# Patient Record
Sex: FEMALE | Race: BLACK OR AFRICAN AMERICAN | NOT HISPANIC OR LATINO | URBAN - METROPOLITAN AREA
[De-identification: names, ages, dates, MRNs, and addresses within clinical notes are randomized per-mention and may not be internally consistent; named-entity substitution may affect disease eponyms.]

---

## 2023-08-13 ENCOUNTER — HOSPITAL ENCOUNTER (OUTPATIENT)
Dept: DATA CONVERSION | Facility: HOSPITAL | Age: 63
Discharge: ANOTHER HOSPITAL | End: 2023-08-14

## 2023-08-13 DIAGNOSIS — R11.0 NAUSEA: ICD-10-CM

## 2023-08-13 DIAGNOSIS — R07.89 OTHER CHEST PAIN: ICD-10-CM

## 2023-08-13 DIAGNOSIS — F17.210 NICOTINE DEPENDENCE, CIGARETTES, UNCOMPLICATED: ICD-10-CM

## 2023-08-13 DIAGNOSIS — R06.02 SHORTNESS OF BREATH: ICD-10-CM

## 2023-08-13 DIAGNOSIS — I21.4 NON-ST ELEVATION (NSTEMI) MYOCARDIAL INFARCTION (MULTI): ICD-10-CM

## 2023-08-13 LAB
ALBUMIN SERPL-MCNC: 4 GM/DL (ref 3.5–5)
ALBUMIN/GLOB SERPL: 1.2 RATIO (ref 1.5–3)
ALP BLD-CCNC: 71 U/L (ref 35–125)
ALT SERPL-CCNC: 12 U/L (ref 5–40)
ANION GAP SERPL CALCULATED.3IONS-SCNC: 10 MMOL/L (ref 0–19)
ANTICOAGULANT: NORMAL
APTT PPP: 23.4 SEC (ref 22–32.5)
AST SERPL-CCNC: 26 U/L (ref 5–40)
BASOPHILS # BLD AUTO: 0.03 K/UL (ref 0–0.22)
BASOPHILS NFR BLD AUTO: 0.5 % (ref 0–1)
BILIRUB SERPL-MCNC: 0.3 MG/DL (ref 0.1–1.2)
BUN SERPL-MCNC: 14 MG/DL (ref 8–25)
BUN/CREAT SERPL: 15.6 RATIO (ref 8–21)
CALCIUM SERPL-MCNC: 9.7 MG/DL (ref 8.5–10.4)
CHLORIDE SERPL-SCNC: 105 MMOL/L (ref 97–107)
CO2 SERPL-SCNC: 23 MMOL/L (ref 24–31)
CREAT SERPL-MCNC: 0.9 MG/DL (ref 0.4–1.6)
DEPRECATED RDW RBC AUTO: 48.5 FL (ref 37–54)
DIFFERENTIAL METHOD BLD: NORMAL
EOSINOPHIL # BLD AUTO: 0.11 K/UL (ref 0–0.45)
EOSINOPHIL NFR BLD: 1.7 % (ref 0–3)
ERYTHROCYTE [DISTWIDTH] IN BLOOD BY AUTOMATED COUNT: 13.8 % (ref 11.7–15)
GFR SERPL CREATININE-BSD FRML MDRD: 72 ML/MIN/1.73 M2
GLOBULIN SER-MCNC: 3.3 G/DL (ref 1.9–3.7)
GLUCOSE SERPL-MCNC: 93 MG/DL (ref 65–99)
HCT VFR BLD AUTO: 42.5 % (ref 36–44)
HGB BLD-MCNC: 13.4 GM/DL (ref 12–15)
HS TROPONIN T DELTA: 101 (ref 0–4)
HS TROPONIN T DELTA: 32 (ref 0–4)
HS TROPONIN T DELTA: ABNORMAL (ref 0–4)
IMM GRANULOCYTES # BLD AUTO: 0.01 K/UL (ref 0–0.1)
INR PPP: 1 (ref 0.86–1.16)
LYMPHOCYTES # BLD AUTO: 1.56 K/UL (ref 1.2–3.2)
LYMPHOCYTES NFR BLD MANUAL: 24.7 % (ref 20–40)
MCH RBC QN AUTO: 29.8 PG (ref 26–34)
MCHC RBC AUTO-ENTMCNC: 31.5 % (ref 31–37)
MCV RBC AUTO: 94.4 FL (ref 80–100)
MONOCYTES # BLD AUTO: 0.33 K/UL (ref 0–0.8)
MONOCYTES NFR BLD MANUAL: 5.2 % (ref 0–8)
NEUTROPHILS # BLD AUTO: 4.28 K/UL
NEUTROPHILS # BLD AUTO: 4.28 K/UL (ref 1.8–7.7)
NEUTROPHILS.IMMATURE NFR BLD: 0.2 % (ref 0–1)
NEUTS SEG NFR BLD: 67.7 % (ref 50–70)
PLATELET # BLD AUTO: 171 K/UL (ref 150–450)
PMV BLD AUTO: 11.2 CU (ref 7–12.6)
POTASSIUM SERPL-SCNC: 4.8 MMOL/L (ref 3.4–5.1)
PROT SERPL-MCNC: 7.3 G/DL (ref 5.9–7.9)
PROTHROMBIN TIME: 9.7 SEC (ref 9.3–12.7)
RBC # BLD AUTO: 4.5 M/UL (ref 4–4.9)
SODIUM SERPL-SCNC: 138 MMOL/L (ref 133–145)
TROPONIN T SERPL-MCNC: 109 NG/L
TROPONIN T SERPL-MCNC: 141 NG/L
TROPONIN T SERPL-MCNC: 242 NG/L
WBC # BLD AUTO: 6.3 K/UL (ref 4.5–11)

## 2023-08-18 ENCOUNTER — PATIENT OUTREACH (OUTPATIENT)
Dept: PRIMARY CARE | Facility: CLINIC | Age: 63
End: 2023-08-18
Payer: MEDICAID

## 2023-08-18 DIAGNOSIS — I21.4 NSTEMI (NON-ST ELEVATED MYOCARDIAL INFARCTION) (MULTI): ICD-10-CM

## 2023-08-18 NOTE — PROGRESS NOTES
TCM call completed (x2 attempts)  Patient is scheduled within 7-14 days of discharge on (8/31/23 ) for hospital follow up, however was unable to reach patient during two business days after discharge despite at least two attempts made.    If patient meets criteria for moderately complex & has follow-up within 14 days-can bill [id number] for hospital follow up.   If patient meets criteria for highly complex & has follow-up visit within 7 days-can bill [id number] for hospital follow up    * Virtual follow up needs modifier added (95 or GT)  AWV AND TCM CAN BE BILLED TOGETHER WITH 25 MODIFIER

## 2023-08-31 ENCOUNTER — OFFICE VISIT (OUTPATIENT)
Dept: PRIMARY CARE | Facility: CLINIC | Age: 63
End: 2023-08-31
Payer: MEDICAID

## 2023-08-31 VITALS
HEIGHT: 63 IN | DIASTOLIC BLOOD PRESSURE: 54 MMHG | RESPIRATION RATE: 15 BRPM | BODY MASS INDEX: 44.63 KG/M2 | OXYGEN SATURATION: 96 % | WEIGHT: 251.9 LBS | SYSTOLIC BLOOD PRESSURE: 102 MMHG | HEART RATE: 76 BPM | TEMPERATURE: 97.4 F

## 2023-08-31 DIAGNOSIS — M17.10 ARTHRITIS OF KNEE: ICD-10-CM

## 2023-08-31 DIAGNOSIS — Z09 HOSPITAL DISCHARGE FOLLOW-UP: Primary | ICD-10-CM

## 2023-08-31 DIAGNOSIS — I21.4 NSTEMI (NON-ST ELEVATED MYOCARDIAL INFARCTION) (MULTI): ICD-10-CM

## 2023-08-31 PROBLEM — R92.8 ABNORMAL MAMMOGRAM: Status: ACTIVE | Noted: 2023-08-31

## 2023-08-31 PROBLEM — G47.33 OBSTRUCTIVE SLEEP APNEA, ADULT: Status: ACTIVE | Noted: 2023-08-31

## 2023-08-31 PROBLEM — M54.50 LOW BACK PAIN: Status: ACTIVE | Noted: 2023-08-31

## 2023-08-31 PROBLEM — M23.92 INTERNAL DERANGEMENT OF LEFT KNEE: Status: ACTIVE | Noted: 2023-08-31

## 2023-08-31 PROBLEM — I83.93 ASYMPTOMATIC VARICOSE VEINS OF BOTH LOWER EXTREMITIES: Status: ACTIVE | Noted: 2023-08-31

## 2023-08-31 PROBLEM — E66.01 MORBID OBESITY (MULTI): Status: ACTIVE | Noted: 2023-08-31

## 2023-08-31 PROBLEM — N39.46 MIXED INCONTINENCE URGE AND STRESS: Status: ACTIVE | Noted: 2023-08-31

## 2023-08-31 PROBLEM — R07.89 ATYPICAL CHEST PAIN: Status: ACTIVE | Noted: 2023-08-31

## 2023-08-31 PROBLEM — M25.531 RIGHT WRIST PAIN: Status: ACTIVE | Noted: 2023-08-31

## 2023-08-31 PROBLEM — M17.12 PRIMARY OSTEOARTHRITIS OF LEFT KNEE: Status: ACTIVE | Noted: 2023-08-31

## 2023-08-31 PROBLEM — R10.30 ABDOMINAL PAIN, LOWER: Status: ACTIVE | Noted: 2023-08-31

## 2023-08-31 PROBLEM — L03.115 CELLULITIS OF RIGHT LOWER EXTREMITY: Status: ACTIVE | Noted: 2023-08-31

## 2023-08-31 PROBLEM — R60.9 EDEMA: Status: ACTIVE | Noted: 2023-08-31

## 2023-08-31 PROBLEM — I83.90 VARICOSE VEIN OF LEG: Status: ACTIVE | Noted: 2023-08-31

## 2023-08-31 PROBLEM — N81.11 CYSTOCELE, MIDLINE: Status: ACTIVE | Noted: 2023-08-31

## 2023-08-31 PROBLEM — E78.5 HYPERLIPIDEMIA: Status: ACTIVE | Noted: 2023-08-31

## 2023-08-31 PROBLEM — R63.4 WEIGHT LOSS, UNINTENTIONAL: Status: ACTIVE | Noted: 2023-08-31

## 2023-08-31 PROBLEM — M25.562 LEFT KNEE PAIN: Status: ACTIVE | Noted: 2023-08-31

## 2023-08-31 PROBLEM — R06.02 SHORTNESS OF BREATH ON EXERTION: Status: ACTIVE | Noted: 2023-08-31

## 2023-08-31 PROBLEM — F17.210 CIGARETTE NICOTINE DEPENDENCE WITHOUT COMPLICATION: Status: ACTIVE | Noted: 2023-08-31

## 2023-08-31 PROBLEM — R31.9 HEMATURIA: Status: ACTIVE | Noted: 2023-08-31

## 2023-08-31 PROBLEM — H90.3 BILATERAL SENSORINEURAL HEARING LOSS: Status: ACTIVE | Noted: 2023-08-31

## 2023-08-31 PROBLEM — R35.0 URINARY FREQUENCY: Status: ACTIVE | Noted: 2023-08-31

## 2023-08-31 PROBLEM — M79.606 LEG PAIN: Status: ACTIVE | Noted: 2023-08-31

## 2023-08-31 PROBLEM — E55.9 VITAMIN D DEFICIENCY: Status: ACTIVE | Noted: 2023-08-31

## 2023-08-31 PROBLEM — E28.39 HYPOESTROGENISM: Status: ACTIVE | Noted: 2023-08-31

## 2023-08-31 PROBLEM — H91.90 HARD OF HEARING: Status: ACTIVE | Noted: 2023-08-31

## 2023-08-31 PROBLEM — N39.41 URGE INCONTINENCE OF URINE: Status: ACTIVE | Noted: 2023-08-31

## 2023-08-31 PROCEDURE — 99213 OFFICE O/P EST LOW 20 MIN: CPT | Performed by: STUDENT IN AN ORGANIZED HEALTH CARE EDUCATION/TRAINING PROGRAM

## 2023-08-31 PROCEDURE — 3008F BODY MASS INDEX DOCD: CPT | Performed by: STUDENT IN AN ORGANIZED HEALTH CARE EDUCATION/TRAINING PROGRAM

## 2023-08-31 RX ORDER — ACETAMINOPHEN 325 MG/1
TABLET ORAL EVERY 6 HOURS
COMMUNITY
Start: 2023-08-16

## 2023-08-31 RX ORDER — METOPROLOL TARTRATE 25 MG/1
1 TABLET, FILM COATED ORAL 2 TIMES DAILY
COMMUNITY
Start: 2023-08-16 | End: 2023-11-10 | Stop reason: SDUPTHER

## 2023-08-31 RX ORDER — CHOLECALCIFEROL (VITAMIN D3) 25 MCG
2 TABLET ORAL DAILY
COMMUNITY
Start: 2021-01-04 | End: 2023-11-10 | Stop reason: WASHOUT

## 2023-08-31 RX ORDER — IBUPROFEN 400 MG/1
TABLET ORAL 3 TIMES DAILY PRN
COMMUNITY
Start: 2022-01-14 | End: 2023-11-10 | Stop reason: ALTCHOICE

## 2023-08-31 RX ORDER — DICLOFENAC SODIUM 10 MG/G
4 GEL TOPICAL 4 TIMES DAILY PRN
Qty: 100 G | Refills: 1 | Status: SHIPPED | OUTPATIENT
Start: 2023-08-31 | End: 2023-11-10 | Stop reason: WASHOUT

## 2023-08-31 RX ORDER — ATORVASTATIN CALCIUM 80 MG/1
1 TABLET, FILM COATED ORAL NIGHTLY
COMMUNITY
Start: 2023-08-16 | End: 2023-11-10 | Stop reason: SDUPTHER

## 2023-08-31 RX ORDER — HYALURONATE SODIUM 20 MG/2 ML
SYRINGE (ML) INTRAARTICULAR
COMMUNITY
Start: 2021-02-03

## 2023-08-31 RX ORDER — CLOPIDOGREL BISULFATE 75 MG/1
1 TABLET ORAL DAILY
COMMUNITY
Start: 2023-08-16 | End: 2023-11-10 | Stop reason: SDUPTHER

## 2023-08-31 RX ORDER — NAPROXEN SODIUM 220 MG/1
1 TABLET, FILM COATED ORAL DAILY
COMMUNITY
Start: 2023-08-21 | End: 2023-11-10 | Stop reason: SDUPTHER

## 2023-08-31 ASSESSMENT — PATIENT HEALTH QUESTIONNAIRE - PHQ9
1. LITTLE INTEREST OR PLEASURE IN DOING THINGS: NOT AT ALL
SUM OF ALL RESPONSES TO PHQ9 QUESTIONS 1 & 2: 0
2. FEELING DOWN, DEPRESSED OR HOPELESS: NOT AT ALL

## 2023-08-31 ASSESSMENT — LIFESTYLE VARIABLES
HOW MANY STANDARD DRINKS CONTAINING ALCOHOL DO YOU HAVE ON A TYPICAL DAY: PATIENT DOES NOT DRINK
SKIP TO QUESTIONS 9-10: 1
HOW OFTEN DO YOU HAVE A DRINK CONTAINING ALCOHOL: NEVER
HOW OFTEN DO YOU HAVE SIX OR MORE DRINKS ON ONE OCCASION: NEVER
AUDIT-C TOTAL SCORE: 0

## 2023-08-31 NOTE — PATIENT INSTRUCTIONS
Continue with current medications.  If blood work or imaging were ordered during your visit, all the nonurgent lab results will be discussed with you at your next office visit.  Please arrive 15 minutes before your appointment.   Return to office in 3 months or as needed with Dr Alfaro

## 2023-08-31 NOTE — PROGRESS NOTES
Subjective   Patient ID: Catarina Finn is a pleasant 62 y.o. female who presents for Hospital Follow-up (Heart attack).  HPI  Patient was admitted to the hospital on 8/14 and was found to have NSTEMI.  She was discharged from the hospital on 8/16.  Followed up with cardiology Dr. Sharpe on 8/21.  Advised to continue with the current medications.  She has arthritis pain in the left knee. Taking Tyelnol. Wouldn't want to take NSAIDs while she is on ASA and plavix.  She cont to smoke, she is cutting down now.     Hospital Course:   Ms. Finn is a 63 y/o female with PMHx of tobacco use who is presenting as a   transfer from [facility] ED for management of NSTEMI. Troponins at   OSH were 109 up trended to 3868 on arrival to [facility]. EKG showed T wave amplitude   elevation in lateral leads, but was otherwise normal. Echo normal with EF of   65%. She had a cath (8/14) that showed no major occlusive disease. Patient   likely had MINOCA as patient had elevated troponins and chest pain. Had   recurrence of chest pressure s/p cath and was started on nitro drip, which was   d/c due to asymptomatic hypotension with SBPs down to 70s. While admitted pt   was started on atorvastatin 80mg, metoprolol tartrate 25mg BID, plavix 75mg,   and ASA 81mg, which she will continue taking as outpatient     Review of Systems   All other systems reviewed and are negative.      Visit Vitals  /54   Pulse 76   Temp 36.3 °C (97.4 °F)   Resp 15          Objective   Physical Exam  Constitutional:       General: She is not in acute distress.     Appearance: Normal appearance.   HENT:      Head: Normocephalic and atraumatic.   Eyes:      General: No scleral icterus.     Conjunctiva/sclera: Conjunctivae normal.   Cardiovascular:      Rate and Rhythm: Normal rate and regular rhythm.      Heart sounds: Normal heart sounds.   Pulmonary:      Effort: Pulmonary effort is normal.      Breath sounds: Normal breath sounds. No wheezing.    Abdominal:      General: Bowel sounds are normal. There is no distension.      Palpations: Abdomen is soft.      Tenderness: There is no abdominal tenderness.   Musculoskeletal:      Cervical back: Neck supple.      Right lower leg: No edema.      Left lower leg: No edema.   Lymphadenopathy:      Cervical: No cervical adenopathy.   Skin:     General: Skin is warm and dry.   Neurological:      General: No focal deficit present.      Mental Status: She is alert and oriented to person, place, and time.   Psychiatric:         Mood and Affect: Mood normal.         Behavior: Behavior normal.         Assessment/Plan   Problem List Items Addressed This Visit       Arthritis of knee    Relevant Medications    diclofenac sodium (Voltaren) 1 % gel gel     Other Visit Diagnoses       Hospital discharge follow-up    -  Primary    NSTEMI (non-ST elevated myocardial infarction) (CMS/[facility])        Relevant Medications    metoprolol tartrate (Lopressor) 25 mg tablet    clopidogrel (Plavix) 75 mg tablet

## 2023-09-12 ENCOUNTER — PATIENT OUTREACH (OUTPATIENT)
Dept: PRIMARY CARE | Facility: CLINIC | Age: 63
End: 2023-09-12
Payer: MEDICAID

## 2023-09-12 NOTE — PROGRESS NOTES
Unable to reach patient for call back after patient's follow up appointment with PCP.   [facility] with call back number for patient to call if needed   If no voicemail available call attempts x 2 were made to contact the patient to assist with any questions or concerns patient may have.

## 2023-09-22 ENCOUNTER — PATIENT OUTREACH (OUTPATIENT)
Dept: PRIMARY CARE | Facility: CLINIC | Age: 63
End: 2023-09-22
Payer: MEDICAID

## 2023-09-22 DIAGNOSIS — Z09 HOSPITAL DISCHARGE FOLLOW-UP: ICD-10-CM

## 2023-09-22 NOTE — PROGRESS NOTES
Unable to reach patient for one month post discharge follow up call.   [facility] with call back number for patient to call if needed   If no voicemail available call attempts x 2 were made to contact the patient to assist with any questions or concerns patient may have. Patient dis-enrolled from [facility] this date due to inability to reach the patient for 30 days after her discharge.

## 2023-10-03 RX ORDER — ONDANSETRON 4 MG/1
1 TABLET, FILM COATED ORAL EVERY 8 HOURS PRN
COMMUNITY
Start: 2021-01-20 | End: 2023-11-10 | Stop reason: ALTCHOICE

## 2023-10-06 ENCOUNTER — OFFICE VISIT (OUTPATIENT)
Dept: ORTHOPEDIC SURGERY | Facility: HOSPITAL | Age: 63
End: 2023-10-06
Payer: MEDICAID

## 2023-10-06 ENCOUNTER — HOSPITAL ENCOUNTER (OUTPATIENT)
Dept: RADIOLOGY | Facility: HOSPITAL | Age: 63
Discharge: HOME | End: 2023-10-06
Payer: MEDICAID

## 2023-10-06 DIAGNOSIS — M25.562 LEFT KNEE PAIN, UNSPECIFIED CHRONICITY: ICD-10-CM

## 2023-10-06 DIAGNOSIS — M17.12 PRIMARY OSTEOARTHRITIS OF LEFT KNEE: Primary | ICD-10-CM

## 2023-10-06 PROCEDURE — 20610 DRAIN/INJ JOINT/BURSA W/O US: CPT | Mod: LT | Performed by: FAMILY MEDICINE

## 2023-10-06 PROCEDURE — 2500000004 HC RX 250 GENERAL PHARMACY W/ HCPCS (ALT 636 FOR OP/ED): Performed by: FAMILY MEDICINE

## 2023-10-06 PROCEDURE — 99214 OFFICE O/P EST MOD 30 MIN: CPT | Performed by: FAMILY MEDICINE

## 2023-10-06 PROCEDURE — 73564 X-RAY EXAM KNEE 4 OR MORE: CPT | Mod: LT,FY

## 2023-10-06 PROCEDURE — 3008F BODY MASS INDEX DOCD: CPT | Performed by: FAMILY MEDICINE

## 2023-10-06 PROCEDURE — 99214 OFFICE O/P EST MOD 30 MIN: CPT | Mod: 25 | Performed by: FAMILY MEDICINE

## 2023-10-06 PROCEDURE — 73564 X-RAY EXAM KNEE 4 OR MORE: CPT | Mod: LEFT SIDE | Performed by: RADIOLOGY

## 2023-10-06 PROCEDURE — 2500000005 HC RX 250 GENERAL PHARMACY W/O HCPCS: Performed by: FAMILY MEDICINE

## 2023-10-06 RX ORDER — LIDOCAINE HYDROCHLORIDE 10 MG/ML
4 INJECTION INFILTRATION; PERINEURAL
Status: COMPLETED | OUTPATIENT
Start: 2023-10-06 | End: 2023-10-06

## 2023-10-06 RX ORDER — TRIAMCINOLONE ACETONIDE 40 MG/ML
40 INJECTION, SUSPENSION INTRA-ARTICULAR; INTRAMUSCULAR
Status: COMPLETED | OUTPATIENT
Start: 2023-10-06 | End: 2023-10-06

## 2023-10-06 RX ADMIN — TRIAMCINOLONE ACETONIDE 40 MG: 40 INJECTION, SUSPENSION INTRA-ARTICULAR; INTRAMUSCULAR at 10:59

## 2023-10-06 RX ADMIN — LIDOCAINE HYDROCHLORIDE 4 ML: 10 INJECTION, SOLUTION INFILTRATION; PERINEURAL at 10:59

## 2023-10-06 NOTE — PROGRESS NOTES
** Please excuse any errors in grammar or translation related to this dictation. Voice recognition software was utilized to prepare this document. **    Assessment & Plan:  62-year-old female presents for ongoing management of her left knee arthritis.  Previously responded well to Euflexxa injections completed in 2021.  She was recently authorized for repeat gel injections however there has been an issue getting these sent for pharmacy to clinic.  She would like to have these completed as soon as possible and has provided the authorization number for these injections to coordinate with the pharmacy.  Informed patient that our clinic will work with her to have these medications obtained as soon as possible.  In the meantime she was offered completion of a steroid injection today to mitigate her symptoms.  She agreed to have this completed as below.  Patient will follow-up once the gel injections are obtained to have completed.  Also discussed need for weight loss to improve overall health but also her joint health specifically.  All questions answered and patient agrees plan of care.    Chief complaint:  Left knee pain    HPI:  62-year-old female, history of a recent MI, presents for chronic left knee pain.  Pain in left knee has been ongoing for several years.  She was previously managed by Dr. Bejarano having a corticosteroid injection completed followed by a Euflexxa series injection completed.  She reports the Euflexxa injections gave her 5 to 6 months of pain relief.  These were completed in 2021.  Last summer she was evaluated Dr. Velázquez and start the authorization process for repeat Euflexxa injections.  However there was a delay in having these obtained and has not yet to have them included.  Presents today to see what can be done to address her pain.  She is unable to use NSAIDs due to her recent MI.  Tylenol does not help with her pain.  She does have a letter with her that states gel injections were  approved as of June 16 however these have not been obtained through pharmacy yet.    Exam:  Left knee examined. No effusion, ecchymosis, or erythema.  AROM from 5 to 115 deg with 5/5 strength. SILT overlying knee. Motion crepitus present. Tenderness along medial and lateral joint lines.  No popliteal mass palpated. Negative anterior and posterior drawer.  No laxity to varus or valgus stress at 0 or 30 deg.  No patellar apprehension.    Uses cane ambulate, has antalgia gait.    Results:  X-rays of left knee obtained 10/6/2023 reviewed and independent reported as mild degenerative changes of the medial compartment.    Procedure:  L Inj/Asp: L knee on 10/6/2023 10:59 AM  Indications: pain  Details: 25 G needle, anteromedial approach  Medications: 40 mg triamcinolone acetonide 40 mg/mL; 4 mL lidocaine 10 mg/mL (1 %)  Outcome: tolerated well, no immediate complications  Procedure, treatment alternatives, risks and benefits explained, specific risks discussed. Consent was given by the patient. Immediately prior to procedure a time out was called to verify the correct patient, procedure, equipment, support staff and site/side marked as required. Patient was prepped and draped in the usual sterile fashion.

## 2023-11-10 ENCOUNTER — OFFICE VISIT (OUTPATIENT)
Dept: CARDIOLOGY | Facility: CLINIC | Age: 63
End: 2023-11-10
Payer: MEDICAID

## 2023-11-10 ENCOUNTER — ANCILLARY PROCEDURE (OUTPATIENT)
Dept: CARDIOLOGY | Facility: CLINIC | Age: 63
End: 2023-11-10
Payer: MEDICAID

## 2023-11-10 VITALS
HEIGHT: 64 IN | BODY MASS INDEX: 42.85 KG/M2 | SYSTOLIC BLOOD PRESSURE: 159 MMHG | OXYGEN SATURATION: 98 % | DIASTOLIC BLOOD PRESSURE: 83 MMHG | WEIGHT: 251 LBS | HEART RATE: 68 BPM

## 2023-11-10 DIAGNOSIS — G47.33 OBSTRUCTIVE SLEEP APNEA, ADULT: ICD-10-CM

## 2023-11-10 DIAGNOSIS — F17.210 CIGARETTE NICOTINE DEPENDENCE WITHOUT COMPLICATION: ICD-10-CM

## 2023-11-10 DIAGNOSIS — I21.4 NON-ST ELEVATION MYOCARDIAL INFARCTION (NSTEMI), SUBSEQUENT EPISODE OF CARE (MULTI): Primary | ICD-10-CM

## 2023-11-10 DIAGNOSIS — E66.01 MORBID OBESITY (MULTI): ICD-10-CM

## 2023-11-10 DIAGNOSIS — R03.0 ELEVATED BLOOD PRESSURE READING WITHOUT DIAGNOSIS OF HYPERTENSION: ICD-10-CM

## 2023-11-10 DIAGNOSIS — E78.2 MIXED HYPERLIPIDEMIA: ICD-10-CM

## 2023-11-10 PROCEDURE — 99215 OFFICE O/P EST HI 40 MIN: CPT | Performed by: INTERNAL MEDICINE

## 2023-11-10 PROCEDURE — 93005 ELECTROCARDIOGRAM TRACING: CPT

## 2023-11-10 PROCEDURE — 3008F BODY MASS INDEX DOCD: CPT | Performed by: INTERNAL MEDICINE

## 2023-11-10 PROCEDURE — 99215 OFFICE O/P EST HI 40 MIN: CPT | Mod: 25 | Performed by: INTERNAL MEDICINE

## 2023-11-10 PROCEDURE — 93010 ELECTROCARDIOGRAM REPORT: CPT | Performed by: INTERNAL MEDICINE

## 2023-11-10 RX ORDER — ISOSORBIDE MONONITRATE 30 MG/1
30 TABLET, EXTENDED RELEASE ORAL DAILY
Qty: 30 TABLET | Refills: 11 | Status: SHIPPED | OUTPATIENT
Start: 2023-11-10 | End: 2024-01-15 | Stop reason: SDUPTHER

## 2023-11-10 RX ORDER — VERAPAMIL HYDROCHLORIDE 180 MG/1
180 TABLET, FILM COATED, EXTENDED RELEASE ORAL NIGHTLY
Qty: 30 TABLET | Refills: 2 | Status: SHIPPED | OUTPATIENT
Start: 2023-11-10 | End: 2024-01-15 | Stop reason: SDUPTHER

## 2023-11-10 ASSESSMENT — ENCOUNTER SYMPTOMS
OCCASIONAL FEELINGS OF UNSTEADINESS: 0
LOSS OF SENSATION IN FEET: 0
DEPRESSION: 0

## 2023-11-10 NOTE — ASSESSMENT & PLAN NOTE
11/10/2023   -- Doing well  -- Maintain DAPT X 1-2 Years for Medical Therapy   -- High Intensity Statin Rx   -- Metoprolol Tartrat 25t mg BID

## 2023-11-10 NOTE — PROGRESS NOTES
"Chief Complaint:   Follow-up (2-3month)     History Of Present Illness:    Catarina Finn is a 62 y.o. female presenting with  pertinent medical history notable for NSTEMI ( Presumed MINOCA, Peak Troponin 3868 ) Non-obstructive coronary artery disease per cath 8/14/2023 with less than 30% occlusive disease in all coronary distribution, Dyslipidemia, essential hypertension, morbid obesity with BMI greater than 40, cigarette and nicotine dependence disorder, obstructive sleep apnea, urinary frequency with urge incontinence presents to cardiology for posthospitalization follow-up and establishment of care.     Recall, she presented to [facility] 8/14/2023 and subsequently transferred to [facility] after presenting with chest discomfort. Initially high-sensitivity cardiac troponins at 109, subsequently peaked at 3800. She underwent cardiac catheterization via right femoral artery (Right Radial Artery with Radial Bend unable to be navigated ) which showed nonobstructive coronary anatomy. LVEDP was not obtained. An echocardiogram completed 8/14 showed ejection fraction 65 to 70%, no wall motion abnormalities, normal diastolic filling, no significant valvular dysfunction, RVSP 35. She was put on dual antiplatelet therapy for medical management of NSTEMI and subsequently discharged.     Since her discharge, she has continued to do well. She has not had any chest heaviness, pressure, pain. She has been compliant with all medications. She however still struggles with cigarette smoking. She is trying to exercise by walking. She offers no cardiovascular complaints.    11/10/2023 -- She returns for follow up. She reports that she is \"still not 100%\". She is able to do many of her activities, but feels that she is is weak and will sometimes feel \"pain\"  States that her \"Mind is telling her one thing, but my mind is telling me another\"  States that she has pain at night when lying in bed and will need to move about to find " "a different position.      Patient reports intermittent fleeting chest pain but no clear-cut angina. Pt denies shortness of breath, dyspnea on exertion, orthopnea, and paroxysmal nocturnal dyspnea. Pt denies worsening lower extremity edema. Pt denies palpitations or syncope. No recent falls. No fever or chills. No cough. No change in bowel or bladder habits. No travel. No sick contacts. No recent travel.     Last Recorded Vitals:  Vitals:    11/10/23 0915   BP: 159/83   Patient Position: Sitting   Pulse: 68   SpO2: 98%   Weight: 114 kg (251 lb)   Height: 1.626 m (5' 4\")   Weight change:       Past Medical History:  She has a past medical history of Body mass index (BMI) 45.0-49.9, adult (CMS/[facility]) (2021), Encounter for screening for malignant neoplasm of colon (2020), Other specified health status (2017), Personal history of other diseases of the musculoskeletal system and connective tissue (2017), and Personal history of other specified conditions (2018).    Past Surgical History:  She has a past surgical history that includes Other surgical history (2017); Tubal ligation (2017); Other surgical history (2017); Hernia repair (2017);  section, classic (2017); and Varicose vein surgery (2017).      Social History:  She reports that she has been smoking cigarettes. She has never used smokeless tobacco. She reports that she does not drink alcohol and does not use drugs.    Family History:  Family History   Problem Relation Name Age of Onset    Diabetes Father          Allergies:  Patient has no known allergies.    Outpatient Medications:  Current Outpatient Medications   Medication Instructions    acetaminophen (Tylenol) 325 mg tablet oral, Every 6 hours    aspirin 81 mg EC tablet TAKE 1 TABLET BY MOUTH ONCE DAILY    atorvastatin (Lipitor) 80 mg tablet TAKE 1 TABLET BY MOUTH ONCE DAILY    clopidogrel (Plavix) 75 mg tablet TAKE 1 TABLET BY MOUTH " "ONCE DAILY    isosorbide mononitrate ER (IMDUR) 30 mg, oral, Daily, Do not crush or chew.    NON FORMULARY 1 year    sodium hyaluronate (Euflexxa) 10 mg/mL(mw 2.4 -3.6 million) injection intra-articular    verapamil SR (CALAN-SR) 180 mg, oral, Nightly, Do not crush or chew.       Physical Exam:  /83 (Patient Position: Sitting)   Pulse 68   Ht 1.626 m (5' 4\")   Wt 114 kg (251 lb)   SpO2 98%   BMI 43.08 kg/m²     General Appearance:  Alert, cooperative, no distress, appears stated age   Head:  Normocephalic, without obvious abnormality, atraumatic   Eyes:  PERRL, EOM's intact, both eyes   Ears:  Normal ears   Nose: Nares normal   Throat: Lips, mucosa, and tongue normal; teeth and gums normal   Neck: Supple, symmetrical, no carotid bruit or JVD   Back:   Symmetric, no curvature, ROM normal, no CVA tenderness   Lungs:   Clear to auscultation bilaterally, respirations unlabored   Heart:  Regular rate and rhythm, S1 and S2 normal, no murmur, rub, or gallop   Abdomen:   Soft, non-tender, bowel sounds active all four quadrants,  no masses, no organomegaly   Extremities: Extremities normal, atraumatic, no cyanosis or edema   Pulses: 2+ and symmetric   Skin: Skin color, texture, turgor normal, no rashes or lesions   Lymph nodes: Cervical, supraclavicular, and axillary nodes normal   Neurologic: Normal          Last Labs:  CBC -  Lab Results   Component Value Date    WBC 6.2 08/15/2023    HGB 11.7 (L) 08/15/2023    HCT 35.9 (L) 08/15/2023    MCV 91 08/15/2023     08/15/2023       CMP -  Lab Results   Component Value Date    CALCIUM 9.0 08/15/2023    PHOS 4.4 08/15/2023    PROT 6.4 08/14/2023    ALBUMIN 3.5 08/15/2023    ALBUMIN 4.0 08/13/2023    AST 28 08/14/2023    ALT 10 08/14/2023    ALKPHOS 53 08/14/2023    BILITOT 0.5 08/14/2023       LIPID PANEL -   Lab Results   Component Value Date    CHOL 192 08/14/2023    TRIG 44 08/14/2023    HDL 63.2 08/14/2023    CHHDL 3.0 08/14/2023    LDLF 120 (H) 08/14/2023    " VLDL 9 08/14/2023       RENAL FUNCTION PANEL -   Lab Results   Component Value Date    GLUCOSE 100 (H) 08/15/2023     08/15/2023    K 4.1 08/15/2023     08/15/2023    CO2 23 08/15/2023    ANIONGAP 13 08/15/2023    BUN 16 08/15/2023    CREATININE 0.92 08/15/2023    CALCIUM 9.0 08/15/2023    PHOS 4.4 08/15/2023    ALBUMIN 3.5 08/15/2023    ALBUMIN 4.0 08/13/2023        Lab Results   Component Value Date    BNP 79 08/14/2023    BNP CANCELED 08/14/2023    HGBA1C 5.2 08/14/2023       Last Cardiology Tests:  ECG:         In Office EKG 11/10/2023 -- Sinus Rhythm at 68 BPM   In Office EKG 8/21/2023 -- Sinus Rhythm @ 76 BPM    Echo:  Echocardiogram 08/2023  CONCLUSIONS:  1. Left ventricular systolic function is normal with a 65-70% estimated ejection fraction.  2. Mildly elevated RVSP.     Echocarriogram Stress Test 05/2022  Summary:  1. No wall motion abnormality on resting or stress echo images, however they are off axis limiting sensitivity of the test.  2. Below average functional capacity 4.6 METs.  3. No clinical or electrocardiographic evidence for ischemia at a maximal workload.  4. The adequate level of stress was achieved.  Cath:  Cardiac Catheterization 08/14/202  Coronary Angiography:  The coronary circulation is right dominant.     Left Main Coronary Artery:  The left main coronary artery is a normal caliber vessel. The left main arises normally from the left coronary sinus of Valsalva and bifurcates into the LAD and circumflex coronary arteries. The left main coronary artery showed no significant disease or stenosis greater than 30%.     Left Anterior Descending Coronary Artery Distribution:  The left anterior descending coronary artery is a normal caliber vessel. The LAD arises normally from the left main coronary artery. The LAD demonstrated no significant disease or stenosis greater than 30%.     Circumflex Coronary Artery Distribution:  The circumflex coronary artery is a normal caliber vessel.  "The circumflex arises normally from the left main coronary artery and terminates in the AV groove. The circumflex revealed no significant disease or stenosis greater than 30%.     Right Coronary Artery Distribution:     The right coronary artery is a normal caliber vessel. The RCA arises normally from the right sinus of Valsalva. The RCA showed no significant disease or stenosis greater than 30%.         Lab review: I have personally reviewed the laboratory result(s)   Diagnostic review: I have personally reviewed the result(s) of the EKG and Echocardiogram .     Assessment/Plan   Problem List Items Addressed This Visit       Cigarette nicotine dependence without complication    Hyperlipidemia    Overview     NSTEMI due to MINOCA 08/2023  -- Medical Therapy   Lab Results   Component Value Date    CHOL 192 08/14/2023    CHOL 180 03/17/2022    CHOL 198 01/06/2021     Lab Results   Component Value Date    HDL 63.2 08/14/2023    HDL 76.5 03/17/2022    HDL 72.8 01/06/2021   No results found for: \"LDLCALC\"  Lab Results   Component Value Date    TRIG 44 08/14/2023    TRIG 38 03/17/2022    TRIG 40 01/06/2021   No components found for: \"CHOLHDL\"           Current Assessment & Plan     Currently maintained on Atorvastatin 80 mg PO Daily          Relevant Orders    Lipid Panel    Renal function panel    Morbid obesity (CMS/[facility])    Overview     Body mass index is 43.08 kg/m².  Weight change:            Non-ST elevation myocardial infarction (NSTEMI), subsequent episode of care (CMS/[facility]) - Primary    Overview     NSTEMI ( Presumed MINOCA, Peak Troponin 3868 ) Non-obstructive coronary artery disease per cath 8/14/2023 with less than 30% occlusive disease in all coronary distribution; An echocardiogram completed 8/14 showed ejection fraction 65 to 70%, no wall motion abnormalities, normal diastolic filling, no significant valvular dysfunction, RVSP 35.          Current Assessment & Plan     11/10/2023   -- Doing well  -- Maintain " DAPT X 1-2 Years for Medical Therapy   -- High Intensity Statin Rx   -- Metoprolol Tartrat 25t mg BID          Relevant Medications    isosorbide mononitrate ER (Imdur) 30 mg 24 hr tablet    verapamil SR (Calan-SR) 180 mg ER tablet    Other Relevant Orders    Lipid Panel    Troponin I, High Sensitivity    Renal function panel    Referral to Cardiac Rehab    Obstructive sleep apnea, adult     Other Visit Diagnoses       Elevated blood pressure reading without diagnosis of hypertension        Relevant Medications    verapamil SR (Calan-SR) 180 mg ER tablet    Other Relevant Orders    Renal function panel               Dusty Sharpe DO

## 2023-11-10 NOTE — PATIENT INSTRUCTIONS
It was a pleasure seeing you today. I would like to see you back in clinic in  2-3  months. You can call my office if questions arise between now and our next visit.     Today we talked about the following:     Please STOP THE METOPROLOL TARTRATE 25 mg TWICE DAILY     Please begin using Verapamil 180 mg Daily AT NIGHT.  This can cause a lower blood pressure ( Which we want for you ) as well as help with smooth muscle spasms    Please try Isosorbide Mononitrate ( Imdur) 30 mg in the Morning.  It is a long acting nitrate that helps to open blood vessels.  This may cause a headache initially. This gets better.  Please give thi at least a week,     Please have blood work done. We may need to increase your cholesterol lower regimen.       PLEASE CALL AND ENROLL IN CARDIAC REHABILITATION> THIS IS SUPER IMPORTANT FOR GETTING YOUR LIFE BACK     Please stop smoking.    --Try to cut back on the number of cigarettes that you smoke.    -- Try to increase the interval between cigarettes.   -- Try to use substitutes such as sugar-free candy carrots,celery sticks as in between.  --  Once you are down to less than half a pack a day try to go cold turkey.   -- Try to designate areas in the your home as smoke-free areas.   -- Try to reduce the number of ashtrays and other reminders of smoking.   -- Try to keep any major something that you dislike next to your cigarette pack to try to develop a mental aversion to smoking      Below are some Heart Health Tips that we provide to all of our patients. I hope you find them useful.     -  We recommend you follow a heart healthy diet. Watch food labels and try not to eat more than 2,500 mg of sodium per day. Avoid foods high in salt like processed meats (lunch meats, mathews, and sausage), processed foods (boxed dinners, canned soups), fried and fast foods. Monitor serving sizes and if the sodium per serving size is more than 200 mg, avoid those foods. If the sodium per serving size is between  "100-200 mg, you can use those in limited quantities. Try to choose foods where the amount of sodium per serving size is less than 100 mg. Try to eat a diet rich in fruits and vegetables, whole grains, low fat dairy products, skinless poultry and fish, nuts, beans, non-tropical vegetable oils. Limit saturated fat, trans fat, sodium, red meats, and sugar-sweetened beverages.   Limit alcohol     -The combination of a reduced-calorie diet and increased physical activity is recommended. Adults should aim to get at least 150 minutes of moderate physical activity per week (30 minutes of moderate physical activities at least 5 days per week). Examples of moderate physical activities include brisk walking, swimming, aerobic dancing, heavy gardening, jumping rope, bicycling 10 MPH or faster, tennis, hiking uphill or with a heavy backpack. Please let us know if you would like to learn more about your nutrition and calories and additional options including weight loss programs to help you reach your goal.     -If you smoke, stop smoking. If you stop smoking you can help get rid of a major source of stress to your heart. Smoking makes your heart rate and blood pressure go up and increases your risk or developing cardiovascular diseases and worsen symptoms associated with heart failure.     -Obtain a BP monitor and monitor your BP daily. Check it around the same time each day; at least 1 hour after taking your medications. Record your BP in a log and bring your log with you to your doctors appointment.     -F/u with your PCP as recommended.     - If you are having problems with medications, consider looking at the following websites.   --  \"GoodRx\"   --  \"[facility]\"    - We are happy to supply written prescriptions if needed to allow you to obtain your medications from different pharmacies. Additionally, if you are having issues with mail order delivery, please let us know. We can send a limited supply of " your medications to your local pharmacy.

## 2023-11-29 ENCOUNTER — OFFICE VISIT (OUTPATIENT)
Dept: ORTHOPEDIC SURGERY | Facility: CLINIC | Age: 63
End: 2023-11-29
Payer: MEDICAID

## 2023-11-29 DIAGNOSIS — M17.12 PRIMARY OSTEOARTHRITIS OF LEFT KNEE: Primary | ICD-10-CM

## 2023-11-29 PROCEDURE — 99213 OFFICE O/P EST LOW 20 MIN: CPT | Performed by: FAMILY MEDICINE

## 2023-11-29 PROCEDURE — 3008F BODY MASS INDEX DOCD: CPT | Performed by: FAMILY MEDICINE

## 2023-11-29 PROCEDURE — 20611 DRAIN/INJ JOINT/BURSA W/US: CPT | Performed by: FAMILY MEDICINE

## 2023-11-29 RX ORDER — LIDOCAINE HYDROCHLORIDE 10 MG/ML
2 INJECTION INFILTRATION; PERINEURAL
Status: COMPLETED | OUTPATIENT
Start: 2023-11-29 | End: 2023-11-29

## 2023-11-29 RX ADMIN — LIDOCAINE HYDROCHLORIDE 2 ML: 10 INJECTION INFILTRATION; PERINEURAL at 08:44

## 2023-11-29 NOTE — PROGRESS NOTES
** Please excuse any errors in grammar or translation related to this dictation. Voice recognition software was utilized to prepare this document. **    Assessment & Plan:  Follow-up for nonoperative management of left knee arthritis.  Steroid injection completed in October did provide symptomatic relief.  Euflexxa injection completed in left knee as below. Will f/u in 1 week for next injection of series.  Can use otc Tylenol and ice to control symptoms in interim. All questions answered and patient is agreeable to this plan.      Chief complaint:  Left knee pain    HPI:  11/29/23: Patient presents today for ongoing nonoperative management of left knee arthritis.  Had steroid injection completed on October 6th which provided pain relief until the last few days.  She was approved to have viscosupplement injection and is here today to have this completed.  No interval history changes reported.    10/6/23: 62-year-old female, history of a recent MI, presents for chronic left knee pain.  Pain in left knee has been ongoing for several years.  She was previously managed by Dr. Bejarano having a corticosteroid injection completed followed by a Euflexxa series injection completed.  She reports the Euflexxa injections gave her 5 to 6 months of pain relief.  These were completed in 2021.  Last summer she was evaluated Dr. Velázquez and start the authorization process for repeat Euflexxa injections.  However there was a delay in having these obtained and has not yet to have them included.  Presents today to see what can be done to address her pain.  She is unable to use NSAIDs due to her recent MI.  Tylenol does not help with her pain.  She does have a letter with her that states gel injections were approved as of June 16 however these have not been obtained through pharmacy yet.    Exam:  Left knee examined. No effusion, ecchymosis, or erythema.  AROM from 5 to 115 deg with 5/5 strength. SILT overlying knee. Motion crepitus  present. Tenderness along medial and lateral joint lines.  No popliteal mass palpated. Negative anterior and posterior drawer.  No laxity to varus or valgus stress at 0 or 30 deg.  No patellar apprehension.    Uses cane ambulate, has antalgia gait.    Results:  X-rays of left knee obtained 10/6/2023 reviewed and independent reported as mild degenerative changes of the medial compartment.    Procedure:  L Inj/Asp: L knee on 11/29/2023 8:44 AM  Indications: pain  Details: 21 G needle, ultrasound-guided superolateral approach  Medications: 20 mg sodium hyaluronate 10 mg/mL(mw 2.4 -3.6 million); 2 mL lidocaine 10 mg/mL (1 %)  Outcome: tolerated well, no immediate complications  Procedure, treatment alternatives, risks and benefits explained, specific risks discussed. Consent was given by the patient. Immediately prior to procedure a time out was called to verify the correct patient, procedure, equipment, support staff and site/side marked as required. Patient was prepped and draped in the usual sterile fashion.

## 2023-12-04 DIAGNOSIS — E78.2 MIXED HYPERLIPIDEMIA: Primary | ICD-10-CM

## 2023-12-06 ENCOUNTER — OFFICE VISIT (OUTPATIENT)
Dept: ORTHOPEDIC SURGERY | Facility: CLINIC | Age: 63
End: 2023-12-06
Payer: MEDICAID

## 2023-12-06 DIAGNOSIS — M17.12 PRIMARY OSTEOARTHRITIS OF LEFT KNEE: Primary | ICD-10-CM

## 2023-12-06 PROCEDURE — 3008F BODY MASS INDEX DOCD: CPT | Performed by: FAMILY MEDICINE

## 2023-12-06 PROCEDURE — 20611 DRAIN/INJ JOINT/BURSA W/US: CPT | Performed by: FAMILY MEDICINE

## 2023-12-06 RX ORDER — LIDOCAINE HYDROCHLORIDE 10 MG/ML
2 INJECTION INFILTRATION; PERINEURAL
Status: COMPLETED | OUTPATIENT
Start: 2023-12-06 | End: 2023-12-06

## 2023-12-06 RX ADMIN — LIDOCAINE HYDROCHLORIDE 2 ML: 10 INJECTION INFILTRATION; PERINEURAL at 08:45

## 2023-12-06 NOTE — PROGRESS NOTES
** Please excuse any errors in grammar or translation related to this dictation. Voice recognition software was utilized to prepare this document. **    Assessment & Plan:  Euflexxa injection completed in left knee as below. Will f/u in 1 week for final injection of series.  Can use otc analgesics (ibuprofen, naproxen, acetaminophen) as able and apply ice pack to control symptoms in interim. All questions answered and patient is agreeable to this plan.      Chief complaint:  Left knee pain    HPI:  12/6/23:  Patient presents for left knee Euflexxa #2 injection.      11/29/23: Patient presents today for ongoing nonoperative management of left knee arthritis.  Had steroid injection completed on October 6th which provided pain relief until the last few days.  She was approved to have viscosupplement injection and is here today to have this completed.  No interval history changes reported.    10/6/23: 62-year-old female, history of a recent MI, presents for chronic left knee pain.  Pain in left knee has been ongoing for several years.  She was previously managed by Dr. Bejarano having a corticosteroid injection completed followed by a Euflexxa series injection completed.  She reports the Euflexxa injections gave her 5 to 6 months of pain relief.  These were completed in 2021.  Last summer she was evaluated Dr. Velázquez and start the authorization process for repeat Euflexxa injections.  However there was a delay in having these obtained and has not yet to have them included.  Presents today to see what can be done to address her pain.  She is unable to use NSAIDs due to her recent MI.  Tylenol does not help with her pain.  She does have a letter with her that states gel injections were approved as of June 16 however these have not been obtained through pharmacy yet.    Exam:  Left knee examined. No effusion, ecchymosis, or erythema.  AROM from 5 to 115 deg with 5/5 strength. SILT overlying knee. Motion crepitus present.  Tenderness along medial and lateral joint lines.  No popliteal mass palpated. Negative anterior and posterior drawer.  No laxity to varus or valgus stress at 0 or 30 deg.  No patellar apprehension.    Uses cane ambulate, has antalgia gait.    Results:  X-rays of left knee obtained 10/6/2023 reviewed and independent reported as mild degenerative changes of the medial compartment.    Procedure:  L Inj/Asp: L knee on 12/6/2023 8:45 AM  Indications: pain  Details: 21 G needle, ultrasound-guided superolateral approach  Medications: 20 mg sodium hyaluronate 10 mg/mL(mw 2.4 -3.6 million); 2 mL lidocaine 10 mg/mL (1 %)  Outcome: tolerated well, no immediate complications  Procedure, treatment alternatives, risks and benefits explained, specific risks discussed. Consent was given by the patient. Immediately prior to procedure a time out was called to verify the correct patient, procedure, equipment, support staff and site/side marked as required. Patient was prepped and draped in the usual sterile fashion.

## 2023-12-10 RX ORDER — ATORVASTATIN CALCIUM 80 MG/1
80 TABLET, FILM COATED ORAL NIGHTLY
Qty: 90 TABLET | Refills: 0 | Status: SHIPPED | OUTPATIENT
Start: 2023-12-10 | End: 2024-01-15 | Stop reason: SDUPTHER

## 2023-12-12 PROBLEM — I21.4 ACUTE NON-ST SEGMENT ELEVATION MYOCARDIAL INFARCTION (MULTI): Status: ACTIVE | Noted: 2023-12-12

## 2023-12-13 ENCOUNTER — ANCILLARY PROCEDURE (OUTPATIENT)
Dept: RADIOLOGY | Facility: CLINIC | Age: 63
End: 2023-12-13
Payer: MEDICAID

## 2023-12-13 ENCOUNTER — LAB (OUTPATIENT)
Dept: LAB | Facility: LAB | Age: 63
End: 2023-12-13
Payer: MEDICAID

## 2023-12-13 ENCOUNTER — OFFICE VISIT (OUTPATIENT)
Dept: ORTHOPEDIC SURGERY | Facility: CLINIC | Age: 63
End: 2023-12-13
Payer: MEDICAID

## 2023-12-13 ENCOUNTER — OFFICE VISIT (OUTPATIENT)
Dept: PRIMARY CARE | Facility: CLINIC | Age: 63
End: 2023-12-13
Payer: MEDICAID

## 2023-12-13 VITALS
WEIGHT: 254.7 LBS | DIASTOLIC BLOOD PRESSURE: 58 MMHG | BODY MASS INDEX: 43.48 KG/M2 | RESPIRATION RATE: 15 BRPM | HEIGHT: 64 IN | TEMPERATURE: 95.8 F | SYSTOLIC BLOOD PRESSURE: 114 MMHG | HEART RATE: 101 BPM | OXYGEN SATURATION: 98 %

## 2023-12-13 DIAGNOSIS — R53.83 FATIGUE, UNSPECIFIED TYPE: ICD-10-CM

## 2023-12-13 DIAGNOSIS — E27.8 ADRENAL MASS 1 CM TO 4 CM IN DIAMETER (MULTI): ICD-10-CM

## 2023-12-13 DIAGNOSIS — M25.512 CHRONIC PAIN OF BOTH SHOULDERS: ICD-10-CM

## 2023-12-13 DIAGNOSIS — G89.29 CHRONIC PAIN OF BOTH SHOULDERS: Primary | ICD-10-CM

## 2023-12-13 DIAGNOSIS — Z12.31 ENCOUNTER FOR SCREENING MAMMOGRAM FOR BREAST CANCER: ICD-10-CM

## 2023-12-13 DIAGNOSIS — H90.3 BILATERAL SENSORINEURAL HEARING LOSS: ICD-10-CM

## 2023-12-13 DIAGNOSIS — R40.0 DAYTIME SLEEPINESS: ICD-10-CM

## 2023-12-13 DIAGNOSIS — F17.210 CIGARETTE NICOTINE DEPENDENCE WITHOUT COMPLICATION: ICD-10-CM

## 2023-12-13 DIAGNOSIS — Z71.6 ENCOUNTER FOR SMOKING CESSATION COUNSELING: ICD-10-CM

## 2023-12-13 DIAGNOSIS — M25.512 CHRONIC PAIN OF BOTH SHOULDERS: Primary | ICD-10-CM

## 2023-12-13 DIAGNOSIS — Z12.11 ENCOUNTER FOR SCREENING FOR MALIGNANT NEOPLASM OF COLON: ICD-10-CM

## 2023-12-13 DIAGNOSIS — M25.511 CHRONIC PAIN OF BOTH SHOULDERS: Primary | ICD-10-CM

## 2023-12-13 DIAGNOSIS — M17.12 PRIMARY OSTEOARTHRITIS OF LEFT KNEE: Primary | ICD-10-CM

## 2023-12-13 DIAGNOSIS — M25.511 CHRONIC PAIN OF BOTH SHOULDERS: ICD-10-CM

## 2023-12-13 DIAGNOSIS — G89.29 CHRONIC PAIN OF BOTH SHOULDERS: ICD-10-CM

## 2023-12-13 LAB
25(OH)D3 SERPL-MCNC: <6 NG/ML (ref 30–100)
ANION GAP SERPL CALC-SCNC: 10 MMOL/L (ref 10–20)
BASOPHILS # BLD AUTO: 0.05 X10*3/UL (ref 0–0.1)
BASOPHILS NFR BLD AUTO: 0.8 %
BUN SERPL-MCNC: 14 MG/DL (ref 6–23)
CALCIUM SERPL-MCNC: 9.4 MG/DL (ref 8.6–10.6)
CHLORIDE SERPL-SCNC: 105 MMOL/L (ref 98–107)
CO2 SERPL-SCNC: 29 MMOL/L (ref 21–32)
CREAT SERPL-MCNC: 0.82 MG/DL (ref 0.5–1.05)
EOSINOPHIL # BLD AUTO: 0.35 X10*3/UL (ref 0–0.7)
EOSINOPHIL NFR BLD AUTO: 5.7 %
ERYTHROCYTE [DISTWIDTH] IN BLOOD BY AUTOMATED COUNT: 14.2 % (ref 11.5–14.5)
GFR SERPL CREATININE-BSD FRML MDRD: 80 ML/MIN/1.73M*2
GLUCOSE SERPL-MCNC: 85 MG/DL (ref 74–99)
HCT VFR BLD AUTO: 42.6 % (ref 36–46)
HGB BLD-MCNC: 13.3 G/DL (ref 12–16)
IMM GRANULOCYTES # BLD AUTO: 0.01 X10*3/UL (ref 0–0.7)
IMM GRANULOCYTES NFR BLD AUTO: 0.2 % (ref 0–0.9)
LYMPHOCYTES # BLD AUTO: 1.22 X10*3/UL (ref 1.2–4.8)
LYMPHOCYTES NFR BLD AUTO: 19.7 %
MCH RBC QN AUTO: 30 PG (ref 26–34)
MCHC RBC AUTO-ENTMCNC: 31.2 G/DL (ref 32–36)
MCV RBC AUTO: 96 FL (ref 80–100)
MONOCYTES # BLD AUTO: 0.33 X10*3/UL (ref 0.1–1)
MONOCYTES NFR BLD AUTO: 5.3 %
NEUTROPHILS # BLD AUTO: 4.23 X10*3/UL (ref 1.2–7.7)
NEUTROPHILS NFR BLD AUTO: 68.3 %
NRBC BLD-RTO: 0 /100 WBCS (ref 0–0)
PLATELET # BLD AUTO: 168 X10*3/UL (ref 150–450)
POTASSIUM SERPL-SCNC: 4.4 MMOL/L (ref 3.5–5.3)
RBC # BLD AUTO: 4.43 X10*6/UL (ref 4–5.2)
SODIUM SERPL-SCNC: 140 MMOL/L (ref 136–145)
TSH SERPL-ACNC: 1.46 MIU/L (ref 0.44–3.98)
VIT B12 SERPL-MCNC: 267 PG/ML (ref 211–911)
WBC # BLD AUTO: 6.2 X10*3/UL (ref 4.4–11.3)

## 2023-12-13 PROCEDURE — 20611 DRAIN/INJ JOINT/BURSA W/US: CPT | Performed by: FAMILY MEDICINE

## 2023-12-13 PROCEDURE — 99215 OFFICE O/P EST HI 40 MIN: CPT | Performed by: FAMILY MEDICINE

## 2023-12-13 PROCEDURE — 73030 X-RAY EXAM OF SHOULDER: CPT | Mod: BILATERAL PROCEDURE | Performed by: RADIOLOGY

## 2023-12-13 PROCEDURE — 82306 VITAMIN D 25 HYDROXY: CPT

## 2023-12-13 PROCEDURE — 36415 COLL VENOUS BLD VENIPUNCTURE: CPT

## 2023-12-13 PROCEDURE — 82607 VITAMIN B-12: CPT

## 2023-12-13 PROCEDURE — 84443 ASSAY THYROID STIM HORMONE: CPT

## 2023-12-13 PROCEDURE — 85025 COMPLETE CBC W/AUTO DIFF WBC: CPT

## 2023-12-13 PROCEDURE — 4004F PT TOBACCO SCREEN RCVD TLK: CPT | Performed by: FAMILY MEDICINE

## 2023-12-13 PROCEDURE — 73030 X-RAY EXAM OF SHOULDER: CPT | Mod: 50

## 2023-12-13 PROCEDURE — 3008F BODY MASS INDEX DOCD: CPT | Performed by: FAMILY MEDICINE

## 2023-12-13 PROCEDURE — 80048 BASIC METABOLIC PNL TOTAL CA: CPT

## 2023-12-13 RX ORDER — LIDOCAINE HYDROCHLORIDE 10 MG/ML
2 INJECTION INFILTRATION; PERINEURAL
Status: COMPLETED | OUTPATIENT
Start: 2023-12-13 | End: 2023-12-13

## 2023-12-13 RX ORDER — DICLOFENAC SODIUM 10 MG/G
2 GEL TOPICAL 4 TIMES DAILY PRN
Qty: 100 G | Refills: 1 | Status: SHIPPED | OUTPATIENT
Start: 2023-12-13

## 2023-12-13 RX ADMIN — LIDOCAINE HYDROCHLORIDE 2 ML: 10 INJECTION INFILTRATION; PERINEURAL at 11:07

## 2023-12-13 ASSESSMENT — PATIENT HEALTH QUESTIONNAIRE - PHQ9
2. FEELING DOWN, DEPRESSED OR HOPELESS: NOT AT ALL
1. LITTLE INTEREST OR PLEASURE IN DOING THINGS: NOT AT ALL
SUM OF ALL RESPONSES TO PHQ9 QUESTIONS 1 & 2: 0

## 2023-12-13 ASSESSMENT — LIFESTYLE VARIABLES
AUDIT-C TOTAL SCORE: 0
SKIP TO QUESTIONS 9-10: 1
HOW OFTEN DO YOU HAVE A DRINK CONTAINING ALCOHOL: NEVER
HOW MANY STANDARD DRINKS CONTAINING ALCOHOL DO YOU HAVE ON A TYPICAL DAY: PATIENT DOES NOT DRINK
HOW OFTEN DO YOU HAVE SIX OR MORE DRINKS ON ONE OCCASION: NEVER

## 2023-12-13 NOTE — PROGRESS NOTES
** Please excuse any errors in grammar or translation related to this dictation. Voice recognition software was utilized to prepare this document. **    Assessment & Plan:  Patient here for ongoing management of left knee arthritis.  Euflexxa injection series completed in left knee as below. Discussed with patient that it can take 2-3 weeks for pain relief to occur. This injection can be repeated again in 6 months if providing symptomatic relief. Patient asked to contact clinic in 5 months to start prior authorization process.  She would like to have single injection series (Durolane, Synvisc-One) if possible in future. For intermittent pain can continue use of oral analgesics such as ibuprofen or acetaminophen, applying ice pack or heating pad.  If has increasing pain prior to then that is not controlled by oral analgesics, can f/u to have CSI completed.  If fails injections therapy, plan for referral to pain management for genicular nerve RFA.  Additionally stress need to lose weight to improve joint health and recommend she speak to PCP about options for this. Declined nutrition referral. All questions answered and patient is agreeable to this plan.         Chief complaint:  Left knee pain    HPI:  12/13/23:  Patient presents for left knee Euflexxa #3 injection. Reporting that overall, feels knee pain has been increasing for several weeks. Particularly during working hours when on feet for several hours at a time.      12/6/23:  Patient presents for left knee Euflexxa #2 injection.      11/29/23: Patient presents today for ongoing nonoperative management of left knee arthritis.  Had steroid injection completed on October 6th which provided pain relief until the last few days.  She was approved to have viscosupplement injection and is here today to have this completed.  No interval history changes reported.    10/6/23: 62-year-old female, history of a recent MI, presents for chronic left knee pain.  Pain in left  knee has been ongoing for several years.  She was previously managed by Dr. Bejarano having a corticosteroid injection completed followed by a Euflexxa series injection completed.  She reports the Euflexxa injections gave her 5 to 6 months of pain relief.  These were completed in 2021.  Last summer she was evaluated Dr. Velázquez and start the authorization process for repeat Euflexxa injections.  However there was a delay in having these obtained and has not yet to have them included.  Presents today to see what can be done to address her pain.  She is unable to use NSAIDs due to her recent MI.  Tylenol does not help with her pain.  She does have a letter with her that states gel injections were approved as of June 16 however these have not been obtained through pharmacy yet.    Exam:  Left knee examined. No effusion, ecchymosis, or erythema.  AROM from 5 to 115 deg with 5/5 strength. SILT overlying knee. Motion crepitus present. Tenderness along medial and lateral joint lines.  No popliteal mass palpated. Negative anterior and posterior drawer.  No laxity to varus or valgus stress at 0 or 30 deg.  No patellar apprehension.    Uses cane ambulate, has antalgia gait.    Results:  X-rays of left knee obtained 10/6/2023 reviewed and independent reported as moderate join space loss of the medial compartment.    Procedure:  L Inj/Asp: L knee on 12/13/2023 11:07 AM  Indications: pain  Details: 21 G needle, ultrasound-guided superolateral approach  Medications: 2 mL lidocaine 10 mg/mL (1 %); 20 mg sodium hyaluronate 10 mg/mL(mw 2.4 -3.6 million)  Outcome: tolerated well, no immediate complications  Procedure, treatment alternatives, risks and benefits explained, specific risks discussed. Consent was given by the patient. Immediately prior to procedure a time out was called to verify the correct patient, procedure, equipment, support staff and site/side marked as required. Patient was prepped and draped in the usual sterile  fashion.

## 2023-12-13 NOTE — PATIENT INSTRUCTIONS
COVID and RSV- can get from your local pharm     Health Maintenance:  - Colonoscopy: 10/10/17- Repeat due in 5 yrs- ordered  - Mammogram: 3/4/22- 1 yr repeat - ordered  - PAP: 11/3/17- Due this year- to call for appt with well woman / gyn  - Bone density DEXA: due at 64  - COVID vaccination status - declined  - Shingrix/ flu/ tdap- declined    Shoulder pain  -Topical creams- Voltaren gel, Salonpas, Icy hot, Bio freeze, Capsaicin, Asper cream, or Tiger balm (these are all over the counter)  - xray ordered  - referral for sport med    Daytime sleepiness/ WANDA/ fatigue  - labs   - to call sleep med ( Dr. Pack) for follow up and for supplies    Hearing  - ordered hearing testing    Ordered Ct adrenals    Knee  - talk with sport med today      Please follow up in 4-6months for wellness or as needed.       ** If labs or imaging ordered at today's visit, all the non-urgent results will be discussed at your next visit    If you have been referred for a special test or to a specialist please call  [phone] to schedule an appointment.  If you have any further questions, or if develop new or worsened symptoms, please give our office a call at [phone].

## 2023-12-13 NOTE — PROGRESS NOTES
"Subjective   Patient ID: Catarina Finn is a 63 y.o. female who presents for Follow-up.    HPI     NSTEMI ( Presumed MINOCA, Peak Troponin 3868 ) Non-obstructive coronary artery disease per cath 8/14/2023 with less than 30% occlusive disease in all coronary distribution, Dyslipidemia, essential hypertension, morbid obesity with BMI greater than 40, cigarette and nicotine dependence disorder, obstructive sleep apnea, urinary frequency with urge incontinence presents     Health Maintenance:  - Colonoscopy: 10/10/17- repeat due in 5 yrs- ordered  - Mammogram: 3/4/22- 1 yr repeat - ordered  - PAP: 11/3/17- due this year- to call for appt  - Bone density DEXA: due at 64  - COVID vaccination status - declined  - Shingrix/ flu/ tdap- declined     Shoulder pain B/L  Unsure if related to meds    Knee pain  Worsened after gel shots  Hard to go up stairs  Upcoming appt  with sport    Poor hearing  Has hearing aid- but not able to get another hearing aid in 2025  Not with hearing testing this year    Fatigue with daytime sleep  Hx of WANDA- not using cpap  Dr. Pack        ---Social---  Job: [employer]  : no  Kids: 2  Likes: relaxing     Review of Systems  All systems reviewed and neg if not noted in the HPI above     Objective   /58   Pulse 101   Temp 35.4 °C (95.8 °F)   Resp 15   Ht 1.626 m (5' 4\")   Wt 116 kg (254 lb 11.2 oz)   SpO2 98%   BMI 43.72 kg/m²     Physical Exam  Pleasant  Eyes: conjunctiva non-icteric and eye lids are without obvious rash or drooping. Pupils are symmetric.   Ears, Nose, Mouth, and Throat: External ears and nose appear to be without deformity or rash. No lesions or masses noted. Hearing is grossly intact.   CV: RRR, no murmur  Pulm:CTA B/L  LE: no edema  Psychiatric: Alert, orientation to person, place, and time. Recent/remote memory as evidenced through face-to-face interaction and discussion appear grossly intact. Mood and affect are normal.      Assessment/Plan   Problem " List Items Addressed This Visit             ICD-10-CM    Bilateral sensorineural hearing loss H90.3    Relevant Orders    Referral to Audiology    Cigarette nicotine dependence without complication F17.210    Adrenal mass 1 cm to 4 cm in diameter (CMS/HCC) E27.8     Seen 2021  Repeat CT ordered         Relevant Orders    CT adrenals w IV contrast    Basic metabolic panel     Other Visit Diagnoses         Codes    Chronic pain of both shoulders    -  Primary M25.511, G89.29, M25.512    Relevant Medications    diclofenac sodium (Voltaren) 1 % gel gel    Other Relevant Orders    XR shoulder 2+ views bilateral    Referral to Sports Medicine    Encounter for smoking cessation counseling     Z71.6    Encounter for screening for malignant neoplasm of colon     Z12.11    Relevant Orders    Colonoscopy Screening; Average Risk Patient    Encounter for screening mammogram for breast cancer     Z12.31    Relevant Orders    BI mammo bilateral screening tomosynthesis    Daytime sleepiness     R40.0    Fatigue, unspecified type     R53.83    Relevant Orders    CBC and Auto Differential    TSH with reflex to Free T4 if abnormal    Vitamin D 25-Hydroxy,Total (for eval of Vitamin D levels)    Vitamin B12        Please follow up in 4-6months for wellness or as needed.

## 2023-12-15 ENCOUNTER — TELEPHONE (OUTPATIENT)
Dept: PRIMARY CARE | Facility: CLINIC | Age: 63
End: 2023-12-15
Payer: MEDICAID

## 2023-12-15 NOTE — TELEPHONE ENCOUNTER
Patient called in for Dr Alfaro to let her know that blood work is needed before she gets her Scan done on Wed.

## 2023-12-15 NOTE — TELEPHONE ENCOUNTER
Patient called in and would like to let you know that she will need blood work before she can go  for the Scan which is coming up on Wed.

## 2023-12-22 DIAGNOSIS — Z12.11 COLON CANCER SCREENING: ICD-10-CM

## 2023-12-26 RX ORDER — POLYETHYLENE GLYCOL 3350, SODIUM SULFATE ANHYDROUS, SODIUM BICARBONATE, SODIUM CHLORIDE, POTASSIUM CHLORIDE 236; 22.74; 6.74; 5.86; 2.97 G/4L; G/4L; G/4L; G/4L; G/4L
4000 POWDER, FOR SOLUTION ORAL ONCE
Qty: 4000 ML | Refills: 0 | Status: SHIPPED | OUTPATIENT
Start: 2023-12-26 | End: 2023-12-26

## 2023-12-28 ENCOUNTER — ANCILLARY PROCEDURE (OUTPATIENT)
Dept: RADIOLOGY | Facility: CLINIC | Age: 63
End: 2023-12-28
Payer: MEDICAID

## 2023-12-28 DIAGNOSIS — E27.8 ADRENAL MASS 1 CM TO 4 CM IN DIAMETER (MULTI): ICD-10-CM

## 2023-12-28 PROCEDURE — 2550000001 HC RX 255 CONTRASTS: Performed by: FAMILY MEDICINE

## 2023-12-28 PROCEDURE — 74170 CT ABD WO CNTRST FLWD CNTRST: CPT | Performed by: STUDENT IN AN ORGANIZED HEALTH CARE EDUCATION/TRAINING PROGRAM

## 2023-12-28 PROCEDURE — 74170 CT ABD WO CNTRST FLWD CNTRST: CPT

## 2023-12-28 RX ADMIN — IOHEXOL 75 ML: 350 INJECTION, SOLUTION INTRAVENOUS at 11:24

## 2023-12-31 ENCOUNTER — HOSPITAL ENCOUNTER (OUTPATIENT)
Dept: RADIOLOGY | Facility: EXTERNAL LOCATION | Age: 63
Discharge: HOME | End: 2023-12-31

## 2024-01-03 ENCOUNTER — OFFICE VISIT (OUTPATIENT)
Dept: ORTHOPEDIC SURGERY | Facility: CLINIC | Age: 64
End: 2024-01-03
Payer: MEDICAID

## 2024-01-03 DIAGNOSIS — M25.512 CHRONIC PAIN OF BOTH SHOULDERS: ICD-10-CM

## 2024-01-03 DIAGNOSIS — M75.40 SUBACROMIAL IMPINGEMENT, UNSPECIFIED LATERALITY: Primary | ICD-10-CM

## 2024-01-03 DIAGNOSIS — G89.29 CHRONIC PAIN OF BOTH SHOULDERS: ICD-10-CM

## 2024-01-03 DIAGNOSIS — M25.511 CHRONIC PAIN OF BOTH SHOULDERS: ICD-10-CM

## 2024-01-03 DIAGNOSIS — S46.009A ROTATOR CUFF INJURY, INITIAL ENCOUNTER: ICD-10-CM

## 2024-01-03 PROCEDURE — 3008F BODY MASS INDEX DOCD: CPT | Performed by: FAMILY MEDICINE

## 2024-01-03 PROCEDURE — 99214 OFFICE O/P EST MOD 30 MIN: CPT | Performed by: FAMILY MEDICINE

## 2024-01-03 PROCEDURE — 20611 DRAIN/INJ JOINT/BURSA W/US: CPT | Performed by: FAMILY MEDICINE

## 2024-01-03 RX ORDER — TRIAMCINOLONE ACETONIDE 40 MG/ML
40 INJECTION, SUSPENSION INTRA-ARTICULAR; INTRAMUSCULAR
Status: COMPLETED | OUTPATIENT
Start: 2024-01-03 | End: 2024-01-03

## 2024-01-03 RX ORDER — LIDOCAINE HYDROCHLORIDE 10 MG/ML
3 INJECTION INFILTRATION; PERINEURAL
Status: COMPLETED | OUTPATIENT
Start: 2024-01-03 | End: 2024-01-03

## 2024-01-03 RX ADMIN — LIDOCAINE HYDROCHLORIDE 3 ML: 10 INJECTION INFILTRATION; PERINEURAL at 11:59

## 2024-01-03 RX ADMIN — TRIAMCINOLONE ACETONIDE 40 MG: 40 INJECTION, SUSPENSION INTRA-ARTICULAR; INTRAMUSCULAR at 11:59

## 2024-01-03 NOTE — PROGRESS NOTES
"** Please excuse any errors in grammar or translation related to this dictation. Voice recognition software was utilized to prepare this document. **    Assessment & Plan:  Clinical presentation most suggestive of rotator cuff pathology as source of pain. Discuss with patient that many people develop degenerative tears/impingement from routine use \"wear and tear\". Treatment of this condition is targeted at reducing pain in order to maintain function. Mainstay of this treatment is physical therapy to improve rotator cuff strength and mobility. Referral to PT provided. Given her current degree of pain, was given options for SAS CSI to mitigate which she agreed to have performed. Follow-up in 6-8 weeks for reassessment if condition not improving with above measures.     Copy of today's encounter sent to referring physician Dr. Alfaro.       Chief complaint:  Bilateral shoulder pain    HPI:  62 y/o RHD  patient, known patient to me for her left knee OA, presents with bilateral shoulder pain.  This complaint has been ongoing for 1-2 months.  No mechanism of injury reported at onset. Symptoms have progressively worsened with time.  Pain is most prominent at lateral shoulders. Symptoms are aggravated by overhead activities and reaching away from the body.  Symptoms more prominent in left shoulder. Has been using Tylenol to manage pain without improvement. Previously saw Dr. Alfaro her PCP for this with last visit being 12/13/23.  Denies previous surgery to this site.     Exam:  BILATERAL Shoulder Exam:  No swelling, warmth or ecchymosis of shoulder present.   AROM: Flexion [170 ] [id number]; Abduction [170] [id number]; IR [70] of 70deg at 90deg; ER [90] of 90deg at 90deg; Horizontal Adduction [130] [id number]  TTP over subacromial space  [5]/5 strength in ER, IR, abduction, flexion   SILT in all dermatomal distributions C5-T1    LABRUM: [+L] O´mohsen´s, [-] Crank test  INSTABILITY: [-] Apprehension   IMPINGEMENT:  [+L>R] Jovonkin´s, " [-] Neer´s, [+ L=R] painful arc  ROTATOR CUFF: [-] Limb drop, [-] lag signs, [+] Empty Can (SS),  [-] Stomach hold (SSc), [-] Hornblower (IS/TM)  BICEPS: [-] Speed´s  AC JOINT: [-] Scarf test    Results:  X-rays of bilateral shoulders obtained 12/13/2023 reviewed and independent interpreted as mild degenerative changes of the acromioclavicular and glenohumeral joints.  No acute fractures.  Normal alignment.    Reviewed referral note from Dr. Alfaro.    Procedure:  Patient ID: Catarina Finn is a 63 y.o. female.    L Inj/Asp: bilateral subacromial bursa on 1/3/2024 11:59 AM  Indications: pain  Details: 25 G needle, ultrasound-guided lateral approach  Medications (Right): 40 mg triamcinolone acetonide 40 mg/mL; 3 mL lidocaine 10 mg/mL (1 %)  Medications (Left): 40 mg triamcinolone acetonide 40 mg/mL; 3 mL lidocaine 10 mg/mL (1 %)  Outcome: tolerated well, no immediate complications  Procedure, treatment alternatives, risks and benefits explained, specific risks discussed. Consent was given by the patient. Immediately prior to procedure a time out was called to verify the correct patient, procedure, equipment, support staff and site/side marked as required. Patient was prepped and draped in the usual sterile fashion.

## 2024-01-03 NOTE — LETTER
"January 3, 2024     Torie Alfaro DO  [address]  [address]  [facility] [address]    Patient: Catarina Finn   YOB: 1960   Date of Visit: 1/3/2024       Dear Dr. Torie Alfaro DO:    Thank you for referring Catarina Finn to me for evaluation. Below are my notes for this consultation.  If you have questions, please do not hesitate to call me. I look forward to following your patient along with you.       Sincerely,     Rom Norton DO      CC: No Recipients  ______________________________________________________________________________________    ** Please excuse any errors in grammar or translation related to this dictation. Voice recognition software was utilized to prepare this document. **    Assessment & Plan:  Clinical presentation most suggestive of rotator cuff pathology as source of pain. Discuss with patient that many people develop degenerative tears/impingement from routine use \"wear and tear\". Treatment of this condition is targeted at reducing pain in order to maintain function. Mainstay of this treatment is physical therapy to improve rotator cuff strength and mobility. Referral to PT provided. Given her current degree of pain, was given options for SAS CSI to mitigate which she agreed to have performed. Follow-up in 6-8 weeks for reassessment if condition not improving with above measures.     Copy of today's encounter sent to referring physician Dr. Alfaro.       Chief complaint:  Bilateral shoulder pain    HPI:  64 y/o RHD  patient, known patient to me for her left knee OA, presents with bilateral shoulder pain.  This complaint has been ongoing for 1-2 months.  No mechanism of injury reported at onset. Symptoms have progressively worsened with time.  Pain is most prominent at lateral shoulders. Symptoms are aggravated by overhead activities and reaching away from the body.  Symptoms more prominent in left shoulder. Has been using Tylenol to manage pain without improvement. " Previously saw Dr. Alfaro her PCP for this with last visit being 12/13/23.  Denies previous surgery to this site.     Exam:  BILATERAL Shoulder Exam:  No swelling, warmth or ecchymosis of shoulder present.   AROM: Flexion [170 ] [id number]; Abduction [170] [id number]; IR [70] of 70deg at 90deg; ER [90] of 90deg at 90deg; Horizontal Adduction [130] [id number]  TTP over subacromial space  [5]/5 strength in ER, IR, abduction, flexion   SILT in all dermatomal distributions C5-T1    LABRUM: [+L] O´mohsen´s, [-] Crank test  INSTABILITY: [-] Apprehension   IMPINGEMENT:  [+L>R] Hawkin´s, [-] Neer´s, [+ L=R] painful arc  ROTATOR CUFF: [-] Limb drop, [-] lag signs, [+] Empty Can (SS),  [-] Stomach hold (SSc), [-] Hornblower (IS/TM)  BICEPS: [-] Speed´s  AC JOINT: [-] Scarf test    Results:  X-rays of bilateral shoulders obtained 12/13/2023 reviewed and independent interpreted as mild degenerative changes of the acromioclavicular and glenohumeral joints.  No acute fractures.  Normal alignment.    Reviewed referral note from Dr. Alfaro.    Procedure:  Patient ID: Catarina Finn is a 63 y.o. female.    L Inj/Asp: bilateral subacromial bursa on 1/3/2024 11:59 AM  Indications: pain  Details: 25 G needle, ultrasound-guided lateral approach  Medications (Right): 40 mg triamcinolone acetonide 40 mg/mL; 3 mL lidocaine 10 mg/mL (1 %)  Medications (Left): 40 mg triamcinolone acetonide 40 mg/mL; 3 mL lidocaine 10 mg/mL (1 %)  Outcome: tolerated well, no immediate complications  Procedure, treatment alternatives, risks and benefits explained, specific risks discussed. Consent was given by the patient. Immediately prior to procedure a time out was called to verify the correct patient, procedure, equipment, support staff and site/side marked as required. Patient was prepped and draped in the usual sterile fashion.

## 2024-01-05 ENCOUNTER — ANCILLARY PROCEDURE (OUTPATIENT)
Dept: RADIOLOGY | Facility: CLINIC | Age: 64
End: 2024-01-05
Payer: MEDICAID

## 2024-01-05 DIAGNOSIS — Z12.31 ENCOUNTER FOR SCREENING MAMMOGRAM FOR BREAST CANCER: ICD-10-CM

## 2024-01-05 PROCEDURE — 77067 SCR MAMMO BI INCL CAD: CPT

## 2024-01-05 PROCEDURE — 77063 BREAST TOMOSYNTHESIS BI: CPT | Performed by: RADIOLOGY

## 2024-01-05 PROCEDURE — 77067 SCR MAMMO BI INCL CAD: CPT | Performed by: RADIOLOGY

## 2024-01-10 ENCOUNTER — LAB (OUTPATIENT)
Dept: LAB | Facility: LAB | Age: 64
End: 2024-01-10
Payer: MEDICAID

## 2024-01-10 DIAGNOSIS — R03.0 ELEVATED BLOOD PRESSURE READING WITHOUT DIAGNOSIS OF HYPERTENSION: ICD-10-CM

## 2024-01-10 DIAGNOSIS — E78.2 MIXED HYPERLIPIDEMIA: ICD-10-CM

## 2024-01-10 DIAGNOSIS — I21.4 NON-ST ELEVATION MYOCARDIAL INFARCTION (NSTEMI), SUBSEQUENT EPISODE OF CARE (MULTI): ICD-10-CM

## 2024-01-10 PROCEDURE — 80061 LIPID PANEL: CPT

## 2024-01-10 PROCEDURE — 84484 ASSAY OF TROPONIN QUANT: CPT

## 2024-01-10 PROCEDURE — 80069 RENAL FUNCTION PANEL: CPT

## 2024-01-10 PROCEDURE — 36415 COLL VENOUS BLD VENIPUNCTURE: CPT

## 2024-01-11 ENCOUNTER — CLINICAL SUPPORT (OUTPATIENT)
Dept: AUDIOLOGY | Facility: CLINIC | Age: 64
End: 2024-01-11
Payer: MEDICAID

## 2024-01-11 ENCOUNTER — APPOINTMENT (OUTPATIENT)
Dept: OTOLARYNGOLOGY | Facility: CLINIC | Age: 64
End: 2024-01-11
Payer: MEDICAID

## 2024-01-11 DIAGNOSIS — H90.3 BILATERAL SENSORINEURAL HEARING LOSS: ICD-10-CM

## 2024-01-11 LAB
ALBUMIN SERPL BCP-MCNC: 3.9 G/DL (ref 3.4–5)
ANION GAP SERPL CALC-SCNC: 11 MMOL/L (ref 10–20)
BUN SERPL-MCNC: 19 MG/DL (ref 6–23)
CALCIUM SERPL-MCNC: 9.1 MG/DL (ref 8.6–10.6)
CARDIAC TROPONIN I PNL SERPL HS: <3 NG/L (ref 0–34)
CHLORIDE SERPL-SCNC: 106 MMOL/L (ref 98–107)
CHOLEST SERPL-MCNC: 160 MG/DL (ref 0–199)
CHOLESTEROL/HDL RATIO: 2.1
CO2 SERPL-SCNC: 27 MMOL/L (ref 21–32)
CREAT SERPL-MCNC: 0.92 MG/DL (ref 0.5–1.05)
EGFRCR SERPLBLD CKD-EPI 2021: 70 ML/MIN/1.73M*2
GLUCOSE SERPL-MCNC: 92 MG/DL (ref 74–99)
HDLC SERPL-MCNC: 77.3 MG/DL
LDLC SERPL CALC-MCNC: 76 MG/DL
NON HDL CHOLESTEROL: 83 MG/DL (ref 0–149)
PHOSPHATE SERPL-MCNC: 3.5 MG/DL (ref 2.5–4.9)
POTASSIUM SERPL-SCNC: 4.2 MMOL/L (ref 3.5–5.3)
SODIUM SERPL-SCNC: 140 MMOL/L (ref 136–145)
TRIGL SERPL-MCNC: 32 MG/DL (ref 0–149)
VLDL: 6 MG/DL (ref 0–40)

## 2024-01-11 PROCEDURE — 92557 COMPREHENSIVE HEARING TEST: CPT

## 2024-01-11 PROCEDURE — 92550 TYMPANOMETRY & REFLEX THRESH: CPT

## 2024-01-11 NOTE — PROGRESS NOTES
AUDIOLOGY EVALUATION    Name:  Catarina Finn  :  1960  Age:  63 y.o.  Date of Evaluation:  2024    IMPRESSIONS     Today's test results indicate normal middle ear functioning with mild sloping to severe sensorineural hearing loss in both ears. It was recommended that she follow up with ENT for ear cleaning. Appointment with Cally Sandoval CNP, originally scheduled for 2/15/2024 however Catarina reported that she had to cancel that appointment as she will be out of town. It was also recommended that she return to [facility] in order to have hearing aids reprogrammed and to check functionality. A copy of her hearing test was provided to her in clinic today.     RECOMMENDATIONS     Re-schedule ENT appointment for ear cleaning.   Re-test hearing annually in order to monitor hearing status.   Return to [facility] for hearing aid care.     Time: 08:30-08:50    HISTORY     Catarina Finn, 63 year old female, was seen today for an audiologic evaluation prior to upcoming ENT appointment with Cally Sandoval CNP. Catarina reported that she has been experiencing decreased hearing sensitivity and the feeling of aural fullness, mostly in the right ear. She has known bilateral sensorineural hearing loss and wears bilateral RAMESH style hearing aids that were fit through [facility]. She noted that a piece on the left hearing aid broke and she is unable to repair it due to the cost. She is not eligible for new hearing aids through insurance coverage for a few years. She has only been wearing the left hearing aid in the meantime, but finds it extremely difficult to hear.     Catarina denied tinnitus, vertiginous dizziness, recent ear infections, otalgia, otorrhea, or history of otologic surgeries.    EVALUATION         TEST RESULTS     Otoscopic Evaluation:  Right Ear: Soft, non-occluding cerumen in ear canal  Left Ear: Soft, non-occluding cerumen in ear canal    Tympanometry:   Right Ear:  Normal, type A tympanogram with normal ear canal volume, peak pressure and compliance.   Left Ear: Normal, type A tympanogram with normal ear canal volume, peak pressure and compliance.     Ipsilateral Acoustic Reflexes:   Right Ear: Present 500-4000 Hz.   Left Ear: Present 500-4000 Hz.     Pure Tone Audiometry:    Right Ear: Mild sloping to severe sensorineural hearing loss (125-8000 Hz).   Left Ear: Mild sloping to severe sensorineural hearing loss (125-8000 Hz).     Speech Audiometry:   Right Ear:  Speech Reception Threshold (SRT) was obtained at 50 dBHL. Word Recognition scores were excellent in quiet when words were presented at 90 dBHL.  Left Ear:  Speech Reception Threshold (SRT) was obtained at 45 dBHL. Word Recognition scores were excellent in quiet when words were presented at 90 dBHL.      APRIL Marie, CCC-A  Licensed Clinical Audiologist      Degree of Hearing Sensitivity Decibel Range   Within Normal Limits (WNL) 0-25   Mild 26-40   Moderate 41-55   Moderately-Severe 56-70   Severe 71-90   Profound 91+      Key   CNT/DNT Could Not Test/Did Not Test   TM Tympanic Membrane   WNL Within Normal Limits   HA Hearing Aid   SNHL Sensorineural Hearing Loss   CHL Conductive Hearing Loss   NIHL Noise-Induced Hearing Loss   ECV Ear Canal Volume   MLV Monitored Live Voice

## 2024-01-12 LAB
ATRIAL RATE: 68 BPM
P AXIS: 52 DEGREES
P OFFSET: 185 MS
P ONSET: 132 MS
PR INTERVAL: 186 MS
Q ONSET: 225 MS
QRS COUNT: 12 BEATS
QRS DURATION: 62 MS
QT INTERVAL: 396 MS
QTC CALCULATION(BAZETT): 421 MS
QTC FREDERICIA: 413 MS
R AXIS: 67 DEGREES
T AXIS: 66 DEGREES
T OFFSET: 423 MS
VENTRICULAR RATE: 68 BPM

## 2024-01-15 ENCOUNTER — OFFICE VISIT (OUTPATIENT)
Dept: CARDIOLOGY | Facility: CLINIC | Age: 64
End: 2024-01-15
Payer: MEDICAID

## 2024-01-15 VITALS
HEIGHT: 64 IN | DIASTOLIC BLOOD PRESSURE: 72 MMHG | WEIGHT: 257 LBS | OXYGEN SATURATION: 97 % | BODY MASS INDEX: 43.87 KG/M2 | HEART RATE: 88 BPM | SYSTOLIC BLOOD PRESSURE: 130 MMHG

## 2024-01-15 DIAGNOSIS — I21.4 NON-ST ELEVATION MYOCARDIAL INFARCTION (NSTEMI), SUBSEQUENT EPISODE OF CARE (MULTI): Primary | ICD-10-CM

## 2024-01-15 DIAGNOSIS — E78.2 MIXED HYPERLIPIDEMIA: ICD-10-CM

## 2024-01-15 DIAGNOSIS — E66.01 MORBID OBESITY (MULTI): ICD-10-CM

## 2024-01-15 DIAGNOSIS — R06.02 SHORTNESS OF BREATH ON EXERTION: ICD-10-CM

## 2024-01-15 DIAGNOSIS — F17.210 CIGARETTE NICOTINE DEPENDENCE WITHOUT COMPLICATION: ICD-10-CM

## 2024-01-15 DIAGNOSIS — G47.33 OBSTRUCTIVE SLEEP APNEA, ADULT: ICD-10-CM

## 2024-01-15 DIAGNOSIS — R03.0 ELEVATED BLOOD PRESSURE READING WITHOUT DIAGNOSIS OF HYPERTENSION: ICD-10-CM

## 2024-01-15 PROCEDURE — 3008F BODY MASS INDEX DOCD: CPT | Performed by: INTERNAL MEDICINE

## 2024-01-15 PROCEDURE — 99215 OFFICE O/P EST HI 40 MIN: CPT | Performed by: INTERNAL MEDICINE

## 2024-01-15 PROCEDURE — 93010 ELECTROCARDIOGRAM REPORT: CPT | Performed by: INTERNAL MEDICINE

## 2024-01-15 PROCEDURE — 93005 ELECTROCARDIOGRAM TRACING: CPT | Performed by: INTERNAL MEDICINE

## 2024-01-15 RX ORDER — ATORVASTATIN CALCIUM 80 MG/1
80 TABLET, FILM COATED ORAL NIGHTLY
Qty: 90 TABLET | Refills: 3 | Status: SHIPPED | OUTPATIENT
Start: 2024-01-15 | End: 2024-05-17 | Stop reason: SDUPTHER

## 2024-01-15 RX ORDER — VERAPAMIL HYDROCHLORIDE 180 MG/1
180 TABLET, FILM COATED, EXTENDED RELEASE ORAL NIGHTLY
Qty: 90 TABLET | Refills: 3 | Status: SHIPPED | OUTPATIENT
Start: 2024-01-15 | End: 2024-05-17 | Stop reason: SDUPTHER

## 2024-01-15 RX ORDER — FUROSEMIDE 40 MG/1
40 TABLET ORAL DAILY
Qty: 90 TABLET | Refills: 3 | Status: SHIPPED | OUTPATIENT
Start: 2024-01-15 | End: 2024-05-17 | Stop reason: SDUPTHER

## 2024-01-15 RX ORDER — CLOPIDOGREL BISULFATE 75 MG/1
75 TABLET ORAL DAILY
Qty: 90 TABLET | Refills: 3 | Status: SHIPPED | OUTPATIENT
Start: 2024-01-15 | End: 2024-05-17 | Stop reason: SDUPTHER

## 2024-01-15 RX ORDER — ISOSORBIDE MONONITRATE 30 MG/1
30 TABLET, EXTENDED RELEASE ORAL DAILY
Qty: 90 TABLET | Refills: 3 | Status: SHIPPED | OUTPATIENT
Start: 2024-01-15 | End: 2024-05-17 | Stop reason: SDUPTHER

## 2024-01-15 RX ORDER — ASPIRIN 81 MG/1
81 TABLET ORAL DAILY
Qty: 90 TABLET | Refills: 3 | Status: SHIPPED | OUTPATIENT
Start: 2024-01-15 | End: 2025-01-14

## 2024-01-15 ASSESSMENT — ENCOUNTER SYMPTOMS
LOSS OF SENSATION IN FEET: 0
OCCASIONAL FEELINGS OF UNSTEADINESS: 0

## 2024-01-15 ASSESSMENT — PAIN SCALES - GENERAL: PAINLEVEL: 0-NO PAIN

## 2024-01-15 NOTE — PATIENT INSTRUCTIONS
It was a pleasure seeing you today. I would like to see you back in clinic in 4 months. You can call my office if questions arise between now and our next visit.     Today we talked about the following:     Please STOP THE METOPROLOL TARTRATE 25 mg TWICE DAILY     Please continue  using Verapamil 180 mg Daily AT NIGHT.  This can cause a lower blood pressure ( Which we want for you ) as well as help with smooth muscle spasms    Please continue Isosorbide Mononitrate ( Imdur) 30 mg in the Morning.  It is a long acting nitrate that helps to open blood vessels.  This may cause a headache initially. This gets better.  Please give thi at least a week,     Please begin using Furosemide 40 mg Daily to help with blood pressure and some fluid retention. Take this first thing in the morning to get the best results.     Please have blood work done. We may need to increase your cholesterol lower regimen.       PLEASE CALL AND ENROLL IN CARDIAC REHABILITATION> THIS IS SUPER IMPORTANT FOR GETTING YOUR LIFE BACK     Please stop smoking.    --Try to cut back on the number of cigarettes that you smoke.    -- Try to increase the interval between cigarettes.   -- Try to use substitutes such as sugar-free candy carrots,celery sticks as in between.  --  Once you are down to less than half a pack a day try to go cold turkey.   -- Try to designate areas in the your home as smoke-free areas.   -- Try to reduce the number of ashtrays and other reminders of smoking.   -- Try to keep any major something that you dislike next to your cigarette pack to try to develop a mental aversion to smoking      Below are some Heart Health Tips that we provide to all of our patients. I hope you find them useful.     -  We recommend you follow a heart healthy diet. Watch food labels and try not to eat more than 2,500 mg of sodium per day. Avoid foods high in salt like processed meats (lunch meats, mathews, and sausage), processed foods (boxed dinners, canned  "soups), fried and fast foods. Monitor serving sizes and if the sodium per serving size is more than 200 mg, avoid those foods. If the sodium per serving size is between 100-200 mg, you can use those in limited quantities. Try to choose foods where the amount of sodium per serving size is less than 100 mg. Try to eat a diet rich in fruits and vegetables, whole grains, low fat dairy products, skinless poultry and fish, nuts, beans, non-tropical vegetable oils. Limit saturated fat, trans fat, sodium, red meats, and sugar-sweetened beverages.   Limit alcohol     -The combination of a reduced-calorie diet and increased physical activity is recommended. Adults should aim to get at least 150 minutes of moderate physical activity per week (30 minutes of moderate physical activities at least 5 days per week). Examples of moderate physical activities include brisk walking, swimming, aerobic dancing, heavy gardening, jumping rope, bicycling 10 MPH or faster, tennis, hiking uphill or with a heavy backpack. Please let us know if you would like to learn more about your nutrition and calories and additional options including weight loss programs to help you reach your goal.     -If you smoke, stop smoking. If you stop smoking you can help get rid of a major source of stress to your heart. Smoking makes your heart rate and blood pressure go up and increases your risk or developing cardiovascular diseases and worsen symptoms associated with heart failure.     -Obtain a BP monitor and monitor your BP daily. Check it around the same time each day; at least 1 hour after taking your medications. Record your BP in a log and bring your log with you to your doctors appointment.     -F/u with your PCP as recommended.     - If you are having problems with medications, consider looking at the following websites.   --  \"GoodRx\"   --  \"[facility]\"    - We are happy to supply written prescriptions if needed to allow you to " obtain your medications from different pharmacies. Additionally, if you are having issues with mail order delivery, please let us know. We can send a limited supply of your medications to your local pharmacy.

## 2024-01-15 NOTE — PROGRESS NOTES
"Chief Complaint:   Follow-up (Patient states she gets SOB with exertion, states it is getting worse)     History Of Present Illness:    Catarina Finn is a 63 y.o. female presenting with  pertinent medical history notable for NSTEMI ( Presumed MINOCA, Peak Troponin 3868 ) Non-obstructive coronary artery disease per cath 8/14/2023 with less than 30% occlusive disease in all coronary distribution, Dyslipidemia, essential hypertension, morbid obesity with BMI greater than 40, cigarette and nicotine dependence disorder, obstructive sleep apnea, urinary frequency with urge incontinence presents to cardiology for posthospitalization follow-up and establishment of care.     Recall, she presented to [facility] 8/14/2023 and subsequently transferred to [facility] after presenting with chest discomfort. Initially high-sensitivity cardiac troponins at 109, subsequently peaked at 3800. She underwent cardiac catheterization via right femoral artery (Right Radial Artery with Radial Bend unable to be navigated ) which showed nonobstructive coronary anatomy. LVEDP was not obtained. An echocardiogram completed 8/14 showed ejection fraction 65 to 70%, no wall motion abnormalities, normal diastolic filling, no significant valvular dysfunction, RVSP 35. She was put on dual antiplatelet therapy for medical management of NSTEMI and subsequently discharged.     Since her discharge, she has continued to do well. She has not had any chest heaviness, pressure, pain. She has been compliant with all medications. She however still struggles with cigarette smoking. She is trying to exercise by walking. She offers no cardiovascular complaints.    11/10/2023 -- She returns for follow up. She reports that she is \"still not 100%\". She is able to do many of her activities, but feels that she is is weak and will sometimes feel \"pain\"  States that her \"Mind is telling her one thing, but my mind is telling me another\"  States that she has pain " "at night when lying in bed and will need to move about to find a different position.     1/15/2024 -- She returns for follow up. She still complains of some shortnbess of breath an states that others note that \"she breathes heavy\"  States that she is still working and walking at the jobn is the summation of her exercise. She notes that when grocery shopping, she will need to cathc her breath as well or slow her pace. She does not improvement in symptoms with the Verapamil     Patient reports intermittent fleeting chest pain but no clear-cut angina. Pt denies shortness of breath, dyspnea on exertion, orthopnea, and paroxysmal nocturnal dyspnea. Pt denies worsening lower extremity edema. Pt denies palpitations or syncope. No recent falls. No fever or chills. No cough. No change in bowel or bladder habits. No travel. No sick contacts. No recent travel.     Last Recorded Vitals:  Vitals:    01/15/24 0849   BP: 130/72   Pulse: 88   SpO2: 97%   Weight: 117 kg (257 lb)   Height: 1.626 m (5' 4\")     Weight change:       Past Medical History:  She has a past medical history of Body mass index (BMI) 45.0-49.9, adult (CMS/[facility]) (2021), Encounter for screening for malignant neoplasm of colon (2020), Other specified health status (2017), Personal history of other diseases of the musculoskeletal system and connective tissue (2017), and Personal history of other specified conditions (2018).    Past Surgical History:  She has a past surgical history that includes Other surgical history (2017); Tubal ligation (2017); Other surgical history (2017); Hernia repair (2017);  section, classic (2017); and Varicose vein surgery (2017).      Social History:  She reports that she has been smoking cigarettes. She has been smoking an average of .25 packs per day. She has never used smokeless tobacco. She reports that she does not drink alcohol and does not use " "drugs.    Family History:  Family History   Problem Relation Name Age of Onset    Diabetes Father          Allergies:  Patient has no known allergies.    Outpatient Medications:  Current Outpatient Medications   Medication Instructions    acetaminophen (Tylenol) 325 mg tablet oral, Every 6 hours    aspirin 81 mg EC tablet TAKE 1 TABLET BY MOUTH ONCE DAILY    atorvastatin (LIPITOR) 80 mg, oral, Nightly    clopidogrel (Plavix) 75 mg tablet TAKE 1 TABLET BY MOUTH ONCE DAILY    diclofenac sodium (Voltaren) 1 % gel gel 0.5 Applications, Topical, 4 times daily PRN    isosorbide mononitrate ER (IMDUR) 30 mg, oral, Daily, Do not crush or chew.    NON FORMULARY 1 year    sodium hyaluronate (Euflexxa) 10 mg/mL(mw 2.4 -3.6 million) injection intra-articular    verapamil SR (CALAN-SR) 180 mg, oral, Nightly, Do not crush or chew.       Physical Exam:  /72   Pulse 88   Ht 1.626 m (5' 4\")   Wt 117 kg (257 lb)   SpO2 97%   BMI 44.11 kg/m²     General Appearance:  Alert, cooperative, no distress, appears stated age   Head:  Normocephalic, without obvious abnormality, atraumatic   Eyes:  PERRL, EOM's intact, both eyes   Ears:  Normal ears   Nose: Nares normal   Throat: Lips, mucosa, and tongue normal; teeth and gums normal   Neck: Supple, symmetrical, no carotid bruit or JVD   Back:   Symmetric, no curvature, ROM normal, no CVA tenderness   Lungs:   Clear to auscultation bilaterally, respirations unlabored   Heart:  Regular rate and rhythm, S1 and S2 normal, no murmur, rub, or gallop   Abdomen:   Soft, non-tender, bowel sounds active all four quadrants,  no masses, no organomegaly   Extremities: Extremities normal, atraumatic, no cyanosis or edema   Pulses: 2+ and symmetric   Skin: Skin color, texture, turgor normal, no rashes or lesions   Lymph nodes: Cervical, supraclavicular, and axillary nodes normal   Neurologic: Normal          Last Labs:  CBC -  Lab Results   Component Value Date    WBC 6.2 12/13/2023    HGB 13.3 " 12/13/2023    HCT 42.6 12/13/2023    MCV 96 12/13/2023     12/13/2023       CMP -  Lab Results   Component Value Date    CALCIUM 9.1 01/10/2024    PHOS 3.5 01/10/2024    PROT 6.4 08/14/2023    ALBUMIN 3.9 01/10/2024    AST 28 08/14/2023    ALT 10 08/14/2023    ALKPHOS 53 08/14/2023    BILITOT 0.5 08/14/2023       LIPID PANEL -   Lab Results   Component Value Date    CHOL 160 01/10/2024    TRIG 32 01/10/2024    HDL 77.3 01/10/2024    CHHDL 2.1 01/10/2024    LDLF 120 (H) 08/14/2023    VLDL 6 01/10/2024    NHDL 83 01/10/2024       RENAL FUNCTION PANEL -   Lab Results   Component Value Date    GLUCOSE 92 01/10/2024     01/10/2024    K 4.2 01/10/2024     01/10/2024    CO2 27 01/10/2024    ANIONGAP 11 01/10/2024    BUN 19 01/10/2024    CREATININE 0.92 01/10/2024    CALCIUM 9.1 01/10/2024    PHOS 3.5 01/10/2024    ALBUMIN 3.9 01/10/2024        Lab Results   Component Value Date    BNP 79 08/14/2023    BNP CANCELED 08/14/2023    HGBA1C 5.2 08/14/2023       Last Cardiology Tests:  ECG:         In Office EKG 11/10/2023 -- Sinus Rhythm at 68 BPM   In Office EKG 8/21/2023 -- Sinus Rhythm @ 76 BPM    Echo:  Echocardiogram 08/2023  CONCLUSIONS:  1. Left ventricular systolic function is normal with a 65-70% estimated ejection fraction.  2. Mildly elevated RVSP.     Echocarriogram Stress Test 05/2022  Summary:  1. No wall motion abnormality on resting or stress echo images, however they are off axis limiting sensitivity of the test.  2. Below average functional capacity 4.6 METs.  3. No clinical or electrocardiographic evidence for ischemia at a maximal workload.  4. The adequate level of stress was achieved.    Cath:  Cardiac Catheterization 08/14/202  Coronary Angiography:  The coronary circulation is right dominant.     Left Main Coronary Artery:  The left main coronary artery is a normal caliber vessel. The left main arises normally from the left coronary sinus of Valsalva and bifurcates into the LAD and  "circumflex coronary arteries. The left main coronary artery showed no significant disease or stenosis greater than 30%.     Left Anterior Descending Coronary Artery Distribution:  The left anterior descending coronary artery is a normal caliber vessel. The LAD arises normally from the left main coronary artery. The LAD demonstrated no significant disease or stenosis greater than 30%.     Circumflex Coronary Artery Distribution:  The circumflex coronary artery is a normal caliber vessel. The circumflex arises normally from the left main coronary artery and terminates in the AV groove. The circumflex revealed no significant disease or stenosis greater than 30%.     Right Coronary Artery Distribution:     The right coronary artery is a normal caliber vessel. The RCA arises normally from the right sinus of Valsalva. The RCA showed no significant disease or stenosis greater than 30%.         Lab review: I have personally reviewed the laboratory result(s)   Diagnostic review: I have personally reviewed the result(s) of the EKG and Echocardiogram .     Assessment/Plan   Problem List Items Addressed This Visit       Cigarette nicotine dependence without complication    Overview     1/2024 -- Still Smoking at least 1/4 PPD            Hyperlipidemia    Overview     NSTEMI due to MINOCA 08/2023  -- Medical Therapy   Lab Results   Component Value Date    CHOL 160 01/10/2024    CHOL 192 08/14/2023    CHOL 180 03/17/2022     Lab Results   Component Value Date    HDL 77.3 01/10/2024    HDL 63.2 08/14/2023    HDL 76.5 03/17/2022     Lab Results   Component Value Date    LDLCALC 76 01/10/2024     Lab Results   Component Value Date    TRIG 32 01/10/2024    TRIG 44 08/14/2023    TRIG 38 03/17/2022   No components found for: \"CHOLHDL\"           Current Assessment & Plan     Currently maintained on Atorvastatin 80 mg PO Daily          Morbid obesity (CMS/[facility])    Overview     Body mass index is 44.11 kg/m².  Weight change:  + 10 pounds " Since August 2023           Non-ST elevation myocardial infarction (NSTEMI), subsequent episode of care (CMS/[facility]) - Primary    Overview     NSTEMI ( Presumed MINOCA, Peak Troponin 3868 ) Non-obstructive coronary artery disease per cath 8/14/2023 with less than 30% occlusive disease in all coronary distribution; An echocardiogram completed 8/14 showed ejection fraction 65 to 70%, no wall motion abnormalities, normal diastolic filling, no significant valvular dysfunction, RVSP 35.          Current Assessment & Plan     11/10/2023   -- Doing well  -- Maintain DAPT X 1-2 Years for Medical Therapy   -- High Intensity Statin Rx   -- Metoprolol Tartrat 25t mg BID  Stoppedin Favor Or Verapamil / IMDur          Obstructive sleep apnea, adult    Overview     Non- Compliant with Sleep Apnea Therapy          Current Assessment & Plan     Improve compliance   Needs equipment and reminded to call and order replacement          Shortness of breath on exertion    Overview     Reports shortness of breath with exertion in the setting o Non-Obstructive Coronary Disease   -- Added NTG / Verapamil for Coronary Vasospasm   -- Add Furosemie to further address BP / [place]   -- Stop Smoking           Other Visit Diagnoses       Elevated blood pressure reading without diagnosis of hypertension                     Dusty Sharpe, DO

## 2024-01-15 NOTE — ASSESSMENT & PLAN NOTE
11/10/2023   -- Doing well  -- Maintain DAPT X 1-2 Years for Medical Therapy   -- High Intensity Statin Rx   -- Metoprolol Tartrat 25t mg BID  Stoppedin Favor Or Verapamil / IMDur

## 2024-01-29 LAB
ATRIAL RATE: 88 BPM
P AXIS: 62 DEGREES
P OFFSET: 187 MS
P ONSET: 133 MS
PR INTERVAL: 184 MS
Q ONSET: 225 MS
QRS COUNT: 14 BEATS
QRS DURATION: 62 MS
QT INTERVAL: 362 MS
QTC CALCULATION(BAZETT): 438 MS
QTC FREDERICIA: 411 MS
R AXIS: 61 DEGREES
T AXIS: 59 DEGREES
T OFFSET: 406 MS
VENTRICULAR RATE: 88 BPM

## 2024-03-02 ENCOUNTER — APPOINTMENT (OUTPATIENT)
Dept: CARDIOLOGY | Facility: HOSPITAL | Age: 64
End: 2024-03-02
Payer: MEDICAID

## 2024-03-02 ENCOUNTER — HOSPITAL ENCOUNTER (EMERGENCY)
Facility: HOSPITAL | Age: 64
Discharge: HOME | End: 2024-03-02
Attending: FAMILY MEDICINE
Payer: MEDICAID

## 2024-03-02 VITALS
TEMPERATURE: 97.3 F | BODY MASS INDEX: 42.85 KG/M2 | DIASTOLIC BLOOD PRESSURE: 65 MMHG | SYSTOLIC BLOOD PRESSURE: 111 MMHG | RESPIRATION RATE: 20 BRPM | HEIGHT: 64 IN | HEART RATE: 78 BPM | OXYGEN SATURATION: 94 % | WEIGHT: 251 LBS

## 2024-03-02 DIAGNOSIS — R07.1 CHEST PAIN ON BREATHING: Primary | ICD-10-CM

## 2024-03-02 LAB
ANION GAP SERPL CALC-SCNC: 11 MMOL/L (ref 10–20)
BASOPHILS # BLD AUTO: 0.01 X10*3/UL (ref 0–0.1)
BASOPHILS NFR BLD AUTO: 0.2 %
BUN SERPL-MCNC: 18 MG/DL (ref 6–23)
CALCIUM SERPL-MCNC: 9 MG/DL (ref 8.6–10.3)
CARDIAC TROPONIN I PNL SERPL HS: 4 NG/L (ref 0–13)
CARDIAC TROPONIN I PNL SERPL HS: 6 NG/L (ref 0–13)
CHLORIDE SERPL-SCNC: 103 MMOL/L (ref 98–107)
CO2 SERPL-SCNC: 27 MMOL/L (ref 21–32)
CREAT SERPL-MCNC: 0.93 MG/DL (ref 0.5–1.05)
EGFRCR SERPLBLD CKD-EPI 2021: 69 ML/MIN/1.73M*2
EOSINOPHIL # BLD AUTO: 0.17 X10*3/UL (ref 0–0.7)
EOSINOPHIL NFR BLD AUTO: 3.1 %
ERYTHROCYTE [DISTWIDTH] IN BLOOD BY AUTOMATED COUNT: 14.7 % (ref 11.5–14.5)
GLUCOSE SERPL-MCNC: 95 MG/DL (ref 74–99)
HCT VFR BLD AUTO: 40.5 % (ref 36–46)
HGB BLD-MCNC: 12.9 G/DL (ref 12–16)
HOLD SPECIMEN: NORMAL
IMM GRANULOCYTES # BLD AUTO: 0.01 X10*3/UL (ref 0–0.7)
IMM GRANULOCYTES NFR BLD AUTO: 0.2 % (ref 0–0.9)
LYMPHOCYTES # BLD AUTO: 1.17 X10*3/UL (ref 1.2–4.8)
LYMPHOCYTES NFR BLD AUTO: 21.1 %
MCH RBC QN AUTO: 30 PG (ref 26–34)
MCHC RBC AUTO-ENTMCNC: 31.9 G/DL (ref 32–36)
MCV RBC AUTO: 94 FL (ref 80–100)
MONOCYTES # BLD AUTO: 0.41 X10*3/UL (ref 0.1–1)
MONOCYTES NFR BLD AUTO: 7.4 %
NEUTROPHILS # BLD AUTO: 3.77 X10*3/UL (ref 1.2–7.7)
NEUTROPHILS NFR BLD AUTO: 68 %
NRBC BLD-RTO: ABNORMAL /100{WBCS}
PLATELET # BLD AUTO: 176 X10*3/UL (ref 150–450)
POTASSIUM SERPL-SCNC: 4.3 MMOL/L (ref 3.5–5.3)
RBC # BLD AUTO: 4.3 X10*6/UL (ref 4–5.2)
SODIUM SERPL-SCNC: 137 MMOL/L (ref 136–145)
WBC # BLD AUTO: 5.5 X10*3/UL (ref 4.4–11.3)

## 2024-03-02 PROCEDURE — 99283 EMERGENCY DEPT VISIT LOW MDM: CPT

## 2024-03-02 PROCEDURE — 84484 ASSAY OF TROPONIN QUANT: CPT | Performed by: FAMILY MEDICINE

## 2024-03-02 PROCEDURE — 80048 BASIC METABOLIC PNL TOTAL CA: CPT | Performed by: FAMILY MEDICINE

## 2024-03-02 PROCEDURE — 85025 COMPLETE CBC W/AUTO DIFF WBC: CPT | Performed by: FAMILY MEDICINE

## 2024-03-02 PROCEDURE — 36415 COLL VENOUS BLD VENIPUNCTURE: CPT | Performed by: FAMILY MEDICINE

## 2024-03-02 PROCEDURE — 93005 ELECTROCARDIOGRAM TRACING: CPT

## 2024-03-02 ASSESSMENT — COLUMBIA-SUICIDE SEVERITY RATING SCALE - C-SSRS
1. IN THE PAST MONTH, HAVE YOU WISHED YOU WERE DEAD OR WISHED YOU COULD GO TO SLEEP AND NOT WAKE UP?: NO
6. HAVE YOU EVER DONE ANYTHING, STARTED TO DO ANYTHING, OR PREPARED TO DO ANYTHING TO END YOUR LIFE?: NO
2. HAVE YOU ACTUALLY HAD ANY THOUGHTS OF KILLING YOURSELF?: NO

## 2024-03-02 ASSESSMENT — PAIN SCALES - GENERAL
PAINLEVEL_OUTOF10: 4
PAINLEVEL_OUTOF10: 0 - NO PAIN
PAINLEVEL_OUTOF10: 4

## 2024-03-02 ASSESSMENT — PAIN DESCRIPTION - DESCRIPTORS
DESCRIPTORS: ACHING
DESCRIPTORS: ACHING;PRESSURE
DESCRIPTORS: PRESSURE

## 2024-03-02 ASSESSMENT — PAIN DESCRIPTION - FREQUENCY: FREQUENCY: CONSTANT/CONTINUOUS

## 2024-03-02 ASSESSMENT — PAIN - FUNCTIONAL ASSESSMENT
PAIN_FUNCTIONAL_ASSESSMENT: 0-10
PAIN_FUNCTIONAL_ASSESSMENT: 0-10

## 2024-03-02 ASSESSMENT — PAIN DESCRIPTION - PAIN TYPE: TYPE: ACUTE PAIN

## 2024-03-02 NOTE — DISCHARGE INSTRUCTIONS
You were seen today for chest pain that started roughly 2 hours prior to being seen.  He noted that you had taken your medications late today.      Your EKG was normal.  Your cardiac enzymes were normal.  CBC and a BMP were also normal.  You stated your symptoms improved during your ED stay.     Strongly recommend you follow-up with your PCP within 3 to 5 days.

## 2024-03-02 NOTE — ED PROVIDER NOTES
"HPI   Chief Complaint   Patient presents with    Chest Pain     Chest pain that began about an hour ago, midepigastrum.  States it started after she took some pills and felt like the pills go stuck, drank some gingerale and pain did improve some but then states that her left arm began to feel weak.  Denies n/v/dizziness but does endorse some SOB.        63-year-old female prior history of smoking, and an NSTEMI in 2023.      Presents today after taking her medications late.  She states she took her aspirin at roughly 3 PM and \"then the 'M' and the 'C' one.\"  1 hour later she states she developed substernal chest pain.  Severity: 10/10.  She describes it now as a tightness, \"like something is stuck.\"  She drank some ginger ale and belched, and felt slightly better.  She also notes that that about the same time, her arm started to ache and become weak.  She had some shortness of breath, but no nausea, vomiting, headache, cough.      History provided by:  Patient   used: No                        [place] Coma Scale Score: 15                     Patient History   Past Medical History:   Diagnosis Date    Body mass index (BMI) 45.0-49.9, adult (CMS/[facility]) 2021    BMI 45.0-49.9, adult    Encounter for screening for malignant neoplasm of colon 2020    Colon cancer screening    Hypertension     NSTEMI (non-ST elevated myocardial infarction) (CMS/[facility])     Other specified health status 2017    No pertinent past medical history    Personal history of other diseases of the musculoskeletal system and connective tissue 2017    History of arthritis    Personal history of other specified conditions 2018    History of urinary incontinence     Past Surgical History:   Procedure Laterality Date     SECTION, CLASSIC  2017     Section    HERNIA REPAIR  2017    Inguinal Hernia Repair    OTHER SURGICAL HISTORY  2017    Leg Repair    OTHER SURGICAL " HISTORY  11/03/2017    Wrist Surgery    TUBAL LIGATION  11/03/2017    Tubal Ligation    VARICOSE VEIN SURGERY  12/04/2017    Varicose Vein Ligation     Family History   Problem Relation Name Age of Onset    Diabetes Father       Social History     Tobacco Use    Smoking status: Every Day     Packs/day: .25     Types: Cigarettes    Smokeless tobacco: Never   Substance Use Topics    Alcohol use: Never    Drug use: Never       Physical Exam   ED Triage Vitals [03/02/24 1721]   Temperature Heart Rate Respirations BP   36.3 °C (97.3 °F) 62 18 132/72      Pulse Ox Temp Source Heart Rate Source Patient Position   98 % Temporal Monitor Sitting      BP Location FiO2 (%)     Left arm --       Physical Exam  Constitutional:       Appearance: She is well-developed. She is obese.   HENT:      Head: Normocephalic.   Eyes:      Extraocular Movements: Extraocular movements intact.   Cardiovascular:      Rate and Rhythm: Normal rate and regular rhythm.      Heart sounds: Normal heart sounds.   Pulmonary:      Effort: Pulmonary effort is normal. No tachypnea or respiratory distress.      Breath sounds: Normal breath sounds.   Chest:      Chest wall: No deformity, tenderness or crepitus.   Abdominal:      General: Bowel sounds are normal.      Palpations: Abdomen is soft.   Musculoskeletal:      Cervical back: Neck supple.   Neurological:      Mental Status: She is alert and oriented to person, place, and time.         ED Course & MDM   Diagnoses as of 03/02/24 1900   Chest pain on breathing       Medical Decision Making  EKG showed normal sinus rhythm.  Troponin was 4.  A second troponin was 6    The patient, during her stay, proceeded to eat a [employer] sandwich.  She noted her chest pain was improved.    She was advised to follow-up with her primary care physician for ongoing management of her health.        Amount and/or Complexity of Data Reviewed  Independent Historian: caregiver  External Data Reviewed: labs and notes.  Labs:  ordered. Decision-making details documented in ED Course.        Procedure  Procedures     Harpreet Zee MD  03/02/24 181       Harpreet Zee MD  03/02/24 1907

## 2024-03-04 LAB
ATRIAL RATE: 74 BPM
P AXIS: 59 DEGREES
P OFFSET: 193 MS
P ONSET: 132 MS
PR INTERVAL: 178 MS
Q ONSET: 221 MS
QRS COUNT: 12 BEATS
QRS DURATION: 74 MS
QT INTERVAL: 388 MS
QTC CALCULATION(BAZETT): 430 MS
QTC FREDERICIA: 416 MS
R AXIS: 67 DEGREES
T AXIS: 67 DEGREES
T OFFSET: 415 MS
VENTRICULAR RATE: 74 BPM

## 2024-03-11 ENCOUNTER — TELEPHONE (OUTPATIENT)
Dept: CARDIOLOGY | Facility: CLINIC | Age: 64
End: 2024-03-11
Payer: MEDICAID

## 2024-03-11 NOTE — TELEPHONE ENCOUNTER
Patient called stating that she is having a colonoscopy this Thursday, 3/14/24. Patient inquiring which medications she should take or not take prior to the colonoscopy. Per Dr Sharpe the patient should continue to take Aspirin daily. Patient is to stop taking Plavix today, 3/11/24 and resume taking Plavix on Saturday, 3/16/24. This nurse informed the patient of the above medication instructions and patient verbalized understanding.

## 2024-03-14 ENCOUNTER — ANESTHESIA (OUTPATIENT)
Dept: GASTROENTEROLOGY | Facility: HOSPITAL | Age: 64
End: 2024-03-14
Payer: MEDICAID

## 2024-03-14 ENCOUNTER — ANESTHESIA EVENT (OUTPATIENT)
Dept: GASTROENTEROLOGY | Facility: HOSPITAL | Age: 64
End: 2024-03-14
Payer: MEDICAID

## 2024-03-14 ENCOUNTER — HOSPITAL ENCOUNTER (OUTPATIENT)
Dept: GASTROENTEROLOGY | Facility: HOSPITAL | Age: 64
Discharge: HOME | End: 2024-03-14
Payer: MEDICAID

## 2024-03-14 VITALS
HEART RATE: 88 BPM | SYSTOLIC BLOOD PRESSURE: 120 MMHG | HEIGHT: 64 IN | OXYGEN SATURATION: 92 % | WEIGHT: 253.53 LBS | DIASTOLIC BLOOD PRESSURE: 76 MMHG | BODY MASS INDEX: 43.28 KG/M2 | TEMPERATURE: 98.8 F | RESPIRATION RATE: 16 BRPM

## 2024-03-14 DIAGNOSIS — M25.512 CHRONIC PAIN OF BOTH SHOULDERS: ICD-10-CM

## 2024-03-14 DIAGNOSIS — G89.29 CHRONIC PAIN OF BOTH SHOULDERS: ICD-10-CM

## 2024-03-14 DIAGNOSIS — I21.4 NON-ST ELEVATION MYOCARDIAL INFARCTION (NSTEMI), SUBSEQUENT EPISODE OF CARE (MULTI): ICD-10-CM

## 2024-03-14 DIAGNOSIS — Z12.11 ENCOUNTER FOR SCREENING FOR MALIGNANT NEOPLASM OF COLON: ICD-10-CM

## 2024-03-14 DIAGNOSIS — M25.511 CHRONIC PAIN OF BOTH SHOULDERS: ICD-10-CM

## 2024-03-14 PROCEDURE — 45378 DIAGNOSTIC COLONOSCOPY: CPT | Mod: 52 | Performed by: INTERNAL MEDICINE

## 2024-03-14 PROCEDURE — A45378 PR COLONOSCOPY,DIAGNOSTIC: Performed by: ANESTHESIOLOGIST ASSISTANT

## 2024-03-14 PROCEDURE — 2500000004 HC RX 250 GENERAL PHARMACY W/ HCPCS (ALT 636 FOR OP/ED): Performed by: ANESTHESIOLOGIST ASSISTANT

## 2024-03-14 PROCEDURE — 3700000002 HC GENERAL ANESTHESIA TIME - EACH INCREMENTAL 1 MINUTE

## 2024-03-14 PROCEDURE — 7100000010 HC PHASE TWO TIME - EACH INCREMENTAL 1 MINUTE

## 2024-03-14 PROCEDURE — 7100000009 HC PHASE TWO TIME - INITIAL BASE CHARGE

## 2024-03-14 PROCEDURE — 3700000001 HC GENERAL ANESTHESIA TIME - INITIAL BASE CHARGE

## 2024-03-14 PROCEDURE — A45378 PR COLONOSCOPY,DIAGNOSTIC: Performed by: ANESTHESIOLOGY

## 2024-03-14 RX ORDER — MIDAZOLAM HYDROCHLORIDE 1 MG/ML
INJECTION INTRAMUSCULAR; INTRAVENOUS AS NEEDED
Status: DISCONTINUED | OUTPATIENT
Start: 2024-03-14 | End: 2024-03-14

## 2024-03-14 RX ORDER — SODIUM CHLORIDE, SODIUM LACTATE, POTASSIUM CHLORIDE, CALCIUM CHLORIDE 600; 310; 30; 20 MG/100ML; MG/100ML; MG/100ML; MG/100ML
INJECTION, SOLUTION INTRAVENOUS CONTINUOUS PRN
Status: DISCONTINUED | OUTPATIENT
Start: 2024-03-14 | End: 2024-03-14

## 2024-03-14 RX ORDER — PROPOFOL 10 MG/ML
INJECTION, EMULSION INTRAVENOUS CONTINUOUS PRN
Status: DISCONTINUED | OUTPATIENT
Start: 2024-03-14 | End: 2024-03-14

## 2024-03-14 RX ORDER — GLYCOPYRROLATE 0.2 MG/ML
INJECTION INTRAMUSCULAR; INTRAVENOUS AS NEEDED
Status: DISCONTINUED | OUTPATIENT
Start: 2024-03-14 | End: 2024-03-14

## 2024-03-14 RX ORDER — ONDANSETRON HYDROCHLORIDE 2 MG/ML
INJECTION, SOLUTION INTRAVENOUS AS NEEDED
Status: DISCONTINUED | OUTPATIENT
Start: 2024-03-14 | End: 2024-03-14

## 2024-03-14 RX ORDER — FENTANYL CITRATE 50 UG/ML
INJECTION, SOLUTION INTRAMUSCULAR; INTRAVENOUS AS NEEDED
Status: DISCONTINUED | OUTPATIENT
Start: 2024-03-14 | End: 2024-03-14

## 2024-03-14 RX ORDER — DIPHENHYDRAMINE HYDROCHLORIDE 50 MG/ML
INJECTION INTRAMUSCULAR; INTRAVENOUS AS NEEDED
Status: DISCONTINUED | OUTPATIENT
Start: 2024-03-14 | End: 2024-03-14

## 2024-03-14 RX ORDER — DEXAMETHASONE SODIUM PHOSPHATE 100 MG/10ML
INJECTION INTRAMUSCULAR; INTRAVENOUS AS NEEDED
Status: DISCONTINUED | OUTPATIENT
Start: 2024-03-14 | End: 2024-03-14

## 2024-03-14 RX ORDER — PROPOFOL 10 MG/ML
INJECTION, EMULSION INTRAVENOUS AS NEEDED
Status: DISCONTINUED | OUTPATIENT
Start: 2024-03-14 | End: 2024-03-14

## 2024-03-14 RX ADMIN — ONDANSETRON 4 MG: 2 INJECTION, SOLUTION INTRAMUSCULAR; INTRAVENOUS at 10:41

## 2024-03-14 RX ADMIN — FENTANYL CITRATE 25 MCG: 50 INJECTION, SOLUTION INTRAMUSCULAR; INTRAVENOUS at 10:47

## 2024-03-14 RX ADMIN — PROPOFOL 30 MG: 10 INJECTION, EMULSION INTRAVENOUS at 10:14

## 2024-03-14 RX ADMIN — PROPOFOL 100 MCG/KG/MIN: 10 INJECTION, EMULSION INTRAVENOUS at 10:15

## 2024-03-14 RX ADMIN — MIDAZOLAM HYDROCHLORIDE 1 MG: 1 INJECTION INTRAMUSCULAR; INTRAVENOUS at 10:54

## 2024-03-14 RX ADMIN — MIDAZOLAM HYDROCHLORIDE 2 MG: 1 INJECTION INTRAMUSCULAR; INTRAVENOUS at 10:32

## 2024-03-14 RX ADMIN — MIDAZOLAM HYDROCHLORIDE 1 MG: 1 INJECTION INTRAMUSCULAR; INTRAVENOUS at 10:47

## 2024-03-14 RX ADMIN — SODIUM CHLORIDE, POTASSIUM CHLORIDE, SODIUM LACTATE AND CALCIUM CHLORIDE: 600; 310; 30; 20 INJECTION, SOLUTION INTRAVENOUS at 10:02

## 2024-03-14 RX ADMIN — FENTANYL CITRATE 25 MCG: 50 INJECTION, SOLUTION INTRAMUSCULAR; INTRAVENOUS at 10:40

## 2024-03-14 RX ADMIN — DIPHENHYDRAMINE HYDROCHLORIDE 25 MG: 50 INJECTION INTRAMUSCULAR; INTRAVENOUS at 10:36

## 2024-03-14 RX ADMIN — DEXAMETHASONE SODIUM PHOSPHATE 4 MG: 10 INJECTION, SOLUTION INTRAMUSCULAR; INTRAVENOUS at 10:41

## 2024-03-14 RX ADMIN — FENTANYL CITRATE 50 MCG: 50 INJECTION, SOLUTION INTRAMUSCULAR; INTRAVENOUS at 10:34

## 2024-03-14 RX ADMIN — DIPHENHYDRAMINE HYDROCHLORIDE 25 MG: 50 INJECTION INTRAMUSCULAR; INTRAVENOUS at 10:40

## 2024-03-14 RX ADMIN — MIDAZOLAM HYDROCHLORIDE 2 MG: 1 INJECTION INTRAMUSCULAR; INTRAVENOUS at 10:11

## 2024-03-14 RX ADMIN — GLYCOPYRROLATE 0.2 MG: 0.2 INJECTION INTRAMUSCULAR; INTRAVENOUS at 10:34

## 2024-03-14 ASSESSMENT — PAIN - FUNCTIONAL ASSESSMENT
PAIN_FUNCTIONAL_ASSESSMENT: 0-10

## 2024-03-14 ASSESSMENT — PAIN SCALES - GENERAL
PAIN_LEVEL: 0
PAINLEVEL_OUTOF10: 0 - NO PAIN

## 2024-03-14 ASSESSMENT — COLUMBIA-SUICIDE SEVERITY RATING SCALE - C-SSRS
2. HAVE YOU ACTUALLY HAD ANY THOUGHTS OF KILLING YOURSELF?: NO
1. IN THE PAST MONTH, HAVE YOU WISHED YOU WERE DEAD OR WISHED YOU COULD GO TO SLEEP AND NOT WAKE UP?: NO
6. HAVE YOU EVER DONE ANYTHING, STARTED TO DO ANYTHING, OR PREPARED TO DO ANYTHING TO END YOUR LIFE?: NO

## 2024-03-14 NOTE — ANESTHESIA POSTPROCEDURE EVALUATION
Patient: Catarina Finn    Procedure Summary       Date: 03/14/24 Room / Location: [facility]    Anesthesia Start: 1002 Anesthesia Stop: 1114    Procedure: COLONOSCOPY Diagnosis: Encounter for screening for malignant neoplasm of colon    Scheduled Providers: Bruce Fraser MD; Bryanna Hawkins MD; MAX Haas; Jonn Ham RN Responsible Provider: Bryanna Hawkins MD    Anesthesia Type: MAC ASA Status: 3            Anesthesia Type: MAC    Vitals Value Taken Time   BP 92/76 03/14/24 1108   Temp 37.2 °C (99 °F) 03/14/24 1108   Pulse 87 03/14/24 1108   Resp 20 03/14/24 1108   SpO2 94 % 03/14/24 1108       Anesthesia Post Evaluation    Patient location during evaluation: PACU  Patient participation: complete - patient participated  Level of consciousness: awake and alert  Pain score: 0  Pain management: adequate  Airway patency: patent  Cardiovascular status: stable  Respiratory status: spontaneous ventilation  Hydration status: acceptable  Postoperative Nausea and Vomiting: none        No notable events documented.

## 2024-03-14 NOTE — ANESTHESIA PREPROCEDURE EVALUATION
Patient: Catarina Finn    Procedure Information       Date/Time: 24 1000    Scheduled providers: Bruce Fraser MD; Bryanna Hawkins MD; MAX Haas; Jonn Ham RN    Procedure: COLONOSCOPY    Location: [facility]                                                                                               Pre-Anesthesia Evaluation      Catarina Finn is a 63 y.o. female who presents for procedure stated above.       Past Medical History:   Diagnosis Date    Body mass index (BMI) 45.0-49.9, adult (CMS/[facility]) 2021    BMI 45.0-49.9, adult    Encounter for screening for malignant neoplasm of colon 2020    Colon cancer screening    Hypertension     NSTEMI (non-ST elevated myocardial infarction) (CMS/[facility])     Other specified health status 2017    No pertinent past medical history    Personal history of other diseases of the musculoskeletal system and connective tissue 2017    History of arthritis    Personal history of other specified conditions 2018    History of urinary incontinence     Past Surgical History:   Procedure Laterality Date     SECTION, CLASSIC  2017     Section    HERNIA REPAIR  2017    Inguinal Hernia Repair    OTHER SURGICAL HISTORY  2017    Leg Repair    OTHER SURGICAL HISTORY  2017    Wrist Surgery    TUBAL LIGATION  2017    Tubal Ligation    VARICOSE VEIN SURGERY  2017    Varicose Vein Ligation     Social History     Tobacco Use    Smoking status: Every Day     Packs/day: .25     Types: Cigarettes    Smokeless tobacco: Never   Substance Use Topics    Alcohol use: Never    Drug use: Never      No Known Allergies   Current Outpatient Medications   Medication Instructions    acetaminophen (Tylenol) 325 mg tablet oral, Every 6 hours    aspirin 81 mg EC tablet TAKE 1 TABLET BY MOUTH ONCE DAILY    atorvastatin (LIPITOR) 80 mg, oral, Nightly    clopidogrel (Plavix) 75 mg tablet last  dose 3/12 TAKE 1 TABLET BY MOUTH ONCE DAILY    diclofenac sodium (Voltaren) 1 % gel gel 0.5 Applications, Topical, 4 times daily PRN    furosemide (LASIX) 40 mg, oral, Daily    isosorbide mononitrate ER (IMDUR) 30 mg, oral, Daily, Do not crush or chew.    NON FORMULARY 1 year    sodium hyaluronate (Euflexxa) 10 mg/mL(mw 2.4 -3.6 million) injection intra-articular    verapamil SR (CALAN-SR) 180 mg, oral, Nightly, Do not crush or chew.            Chemistry    Lab Results   Component Value Date/Time     03/02/2024 1712    K 4.3 03/02/2024 1712     03/02/2024 1712    CO2 27 03/02/2024 1712    BUN 18 03/02/2024 1712    CREATININE 0.93 03/02/2024 1712    Lab Results   Component Value Date/Time    CALCIUM 9.0 03/02/2024 1712    ALKPHOS 53 08/14/2023 0303    AST 28 08/14/2023 0303    ALT 10 08/14/2023 0303    BILITOT 0.5 08/14/2023 0303          Lab Results   Component Value Date/Time    WBC 5.5 03/02/2024 1712    HGB 12.9 03/02/2024 1712    HCT 40.5 03/02/2024 1712     03/02/2024 1712     Lab Results   Component Value Date/Time    PROTIME 12.2 08/14/2023 0304    INR 1.1 08/14/2023 0304        Past Cardiology Tests (Last 3 Years):  EKG:  Results for orders placed during the hospital encounter of 03/02/24    ECG 12 lead    Narrative  Normal sinus rhythm  Normal ECG  When compared with ECG of 15-DANIEL-2024 09:51,  No significant change was found  Confirmed by Dangelo Bates ([id number]) on 3/4/2024 10:57:11 AM        Echo: 8/14/23  1. Left ventricular systolic function is normal with a 65-70% estimated ejection fraction.  2. Mildly elevated RVSP  No results found for this or any previous visit.      Cath: 8/14/23    The coronary circulation is right dominant.     Left Main Coronary Artery:  The left main coronary artery is a normal caliber vessel. The left main arises normally from the left coronary sinus of Valsalva and bifurcates into the LAD and circumflex coronary arteries. The left main coronary artery showed no  "significant disease or stenosis greater than 30%.     Left Anterior Descending Coronary Artery Distribution:  The left anterior descending coronary artery is a normal caliber vessel. The LAD arises normally from the left main coronary artery. The LAD demonstrated no significant disease or stenosis greater than 30%.     Circumflex Coronary Artery Distribution:  The circumflex coronary artery is a normal caliber vessel. The circumflex arises normally from the left main coronary artery and terminates in the AV groove. The circumflex revealed no significant disease or stenosis greater than 30%.     Right Coronary Artery Distribution:     The right coronary artery is a normal caliber vessel. The RCA arises normally from the right sinus of Valsalva. The RCA showed no significant disease or stenosis greater than 30%.     No results found for this or any previous visit.    Stress Test:  No results found for this or any previous visit from the past 1000 days.      Visit Vitals  /56   Pulse 83   Temp 36.7 °C (98.1 °F) (Temporal)   Resp 16   Ht 1.626 m (5' 4\")   Wt 115 kg (253 lb 8.5 oz)   SpO2 95%   BMI 43.52 kg/m²   OB Status Postmenopausal   Smoking Status Every Day   BSA 2.28 m²          Relevant Problems   Cardiovascular   (+) Acute non-ST segment elevation myocardial infarction (CMS/HCC)   (+) Atypical chest pain   (+) Hyperlipidemia   (+) Non-ST elevation myocardial infarction (NSTEMI), subsequent episode of care (CMS/[facility])      Endocrine   (+) Morbid obesity (CMS/HCC)      Pulmonary   (+) Obstructive sleep apnea, adult   (+) Shortness of breath on exertion      Musculoskeletal   (+) Primary osteoarthritis of left knee      Eyes, Ears, Nose, and Throat   (+) Bilateral sensorineural hearing loss   (+) Hard of hearing      Other   (+) Arthritis of knee       Clinical information reviewed:   Tobacco  Allergies  Meds   Med Hx  Surg Hx  OB Status  Fam Hx  Soc   Hx        NPO Detail:  NPO/Void Status  Date of Last " Liquid: 03/14/24  Time of Last Liquid: 0500  Date of Last Solid: 03/13/24  Time of Last Solid: 1200         Physical Exam    Airway  Mallampati: II  TM distance: <3 FB  Neck ROM: full     Cardiovascular   Rhythm: regular  Rate: normal     Dental   (+) upper dentures, lower dentures     Pulmonary   Comments: Non labored respiration   Abdominal            Anesthesia Plan    History of general anesthesia?: yes  History of complications of general anesthesia?: no    ASA 3     MAC     intravenous induction   Anesthetic plan and risks discussed with patient.    Plan discussed with CRNA and CAA.

## 2024-03-14 NOTE — POST-PROCEDURE NOTE
Patient had a colonoscopy that was completed to the mid ascending colon only due to significant redundancy and looping of the instrument despite changing instruments, abdominal pressure, and water immersion.  Despite all these maneuvers the cecum could not be intubated.    I recommend a repeat exam in 2027 which would be consistent with the guidelines as she had one in 2017 which was normal and could be done sooner if symptoms so dictate it.    She will restart all meds.

## 2024-04-05 ENCOUNTER — TELEPHONE (OUTPATIENT)
Dept: ORTHOPEDIC SURGERY | Facility: HOSPITAL | Age: 64
End: 2024-04-05
Payer: MEDICAID

## 2024-04-05 DIAGNOSIS — M25.511 CHRONIC PAIN OF BOTH SHOULDERS: ICD-10-CM

## 2024-04-05 DIAGNOSIS — M25.512 CHRONIC PAIN OF BOTH SHOULDERS: ICD-10-CM

## 2024-04-05 DIAGNOSIS — M17.12 PRIMARY OSTEOARTHRITIS OF LEFT KNEE: ICD-10-CM

## 2024-04-05 DIAGNOSIS — G89.29 CHRONIC PAIN OF BOTH SHOULDERS: ICD-10-CM

## 2024-04-05 NOTE — TELEPHONE ENCOUNTER
Patient is requesting a new disability placard.  Her previous one from Dr. Bejarano has .  We have seen her for her knee and shoulder.  If this is okay she would like to pick it up at the [place] office.

## 2024-05-16 ENCOUNTER — APPOINTMENT (OUTPATIENT)
Dept: CARDIOLOGY | Facility: CLINIC | Age: 64
End: 2024-05-16
Payer: MEDICAID

## 2024-05-17 ENCOUNTER — OFFICE VISIT (OUTPATIENT)
Dept: CARDIOLOGY | Facility: CLINIC | Age: 64
End: 2024-05-17
Payer: MEDICAID

## 2024-05-17 VITALS
WEIGHT: 260 LBS | HEART RATE: 88 BPM | DIASTOLIC BLOOD PRESSURE: 74 MMHG | SYSTOLIC BLOOD PRESSURE: 114 MMHG | HEIGHT: 64 IN | OXYGEN SATURATION: 96 % | BODY MASS INDEX: 44.39 KG/M2

## 2024-05-17 DIAGNOSIS — I21.4 NON-ST ELEVATION MYOCARDIAL INFARCTION (NSTEMI), SUBSEQUENT EPISODE OF CARE (MULTI): Primary | ICD-10-CM

## 2024-05-17 DIAGNOSIS — E78.2 MIXED HYPERLIPIDEMIA: ICD-10-CM

## 2024-05-17 DIAGNOSIS — R03.0 ELEVATED BLOOD PRESSURE READING WITHOUT DIAGNOSIS OF HYPERTENSION: ICD-10-CM

## 2024-05-17 DIAGNOSIS — R06.02 SHORTNESS OF BREATH ON EXERTION: ICD-10-CM

## 2024-05-17 DIAGNOSIS — E66.01 MORBID OBESITY (MULTI): ICD-10-CM

## 2024-05-17 DIAGNOSIS — F17.210 CIGARETTE NICOTINE DEPENDENCE WITHOUT COMPLICATION: ICD-10-CM

## 2024-05-17 LAB
ATRIAL RATE: 88 BPM
P AXIS: 52 DEGREES
P OFFSET: 181 MS
P ONSET: 138 MS
PR INTERVAL: 164 MS
Q ONSET: 220 MS
QRS COUNT: 15 BEATS
QRS DURATION: 70 MS
QT INTERVAL: 370 MS
QTC CALCULATION(BAZETT): 447 MS
QTC FREDERICIA: 420 MS
R AXIS: 62 DEGREES
T AXIS: 63 DEGREES
T OFFSET: 405 MS
VENTRICULAR RATE: 88 BPM

## 2024-05-17 PROCEDURE — 93005 ELECTROCARDIOGRAM TRACING: CPT | Performed by: INTERNAL MEDICINE

## 2024-05-17 PROCEDURE — 99214 OFFICE O/P EST MOD 30 MIN: CPT | Performed by: INTERNAL MEDICINE

## 2024-05-17 PROCEDURE — 3008F BODY MASS INDEX DOCD: CPT | Performed by: INTERNAL MEDICINE

## 2024-05-17 RX ORDER — ATORVASTATIN CALCIUM 80 MG/1
80 TABLET, FILM COATED ORAL NIGHTLY
Qty: 90 TABLET | Refills: 3 | Status: SHIPPED | OUTPATIENT
Start: 2024-05-17 | End: 2025-05-17

## 2024-05-17 RX ORDER — FUROSEMIDE 40 MG/1
40 TABLET ORAL DAILY
Qty: 90 TABLET | Refills: 3 | Status: SHIPPED | OUTPATIENT
Start: 2024-05-17 | End: 2025-05-17

## 2024-05-17 RX ORDER — CLOPIDOGREL BISULFATE 75 MG/1
75 TABLET ORAL DAILY
Qty: 90 TABLET | Refills: 3 | Status: SHIPPED | OUTPATIENT
Start: 2024-05-17 | End: 2025-05-17

## 2024-05-17 RX ORDER — VERAPAMIL HYDROCHLORIDE 180 MG/1
180 TABLET, FILM COATED, EXTENDED RELEASE ORAL NIGHTLY
Qty: 90 TABLET | Refills: 3 | Status: SHIPPED | OUTPATIENT
Start: 2024-05-17 | End: 2025-05-17

## 2024-05-17 RX ORDER — ISOSORBIDE MONONITRATE 30 MG/1
30 TABLET, EXTENDED RELEASE ORAL DAILY
Qty: 90 TABLET | Refills: 3 | Status: SHIPPED | OUTPATIENT
Start: 2024-05-17 | End: 2025-05-17

## 2024-05-17 ASSESSMENT — ENCOUNTER SYMPTOMS
OCCASIONAL FEELINGS OF UNSTEADINESS: 0
LOSS OF SENSATION IN FEET: 0
DEPRESSION: 0

## 2024-05-17 ASSESSMENT — PAIN SCALES - GENERAL: PAINLEVEL: 0-NO PAIN

## 2024-05-17 NOTE — ASSESSMENT & PLAN NOTE
5/17/2024 --   She has continued to smoke   She has not been using her lasix  Her symptoms are unchanged.    -- Stressed the importance of exercise, apporpriate diet,

## 2024-05-17 NOTE — PATIENT INSTRUCTIONS
It was a pleasure seeing you today. I would like to see you back in clinic in 4 months. You can call my office if questions arise between now and our next visit.     Today we talked about the following:   You need to make sure that you are taking the medications to help you     Please look at Mario Vega on Youtube to help start exercising ( [email or web address])  All of his exercise programs are on YOUTUBE.. Try 10 minutes in the Morning and 10 minutes in the evening.     Please continue  using Verapamil 180 mg Daily AT NIGHT.  This can cause a lower blood pressure ( Which we want for you ) as well as help with smooth muscle spasms    Please continue Isosorbide Mononitrate ( Imdur) 30 mg in the Morning.  It is a long acting nitrate that helps to open blood vessels.  This may cause a headache initially. This gets better.  Please give thi at least a week,     Please begin using Furosemide 40 mg Daily to help with blood pressure and some fluid retention. Take this first thing in the morning to get the best results.     Please have blood work done. We may need to increase your cholesterol lower regimen.       PLEASE CALL AND ENROLL IN CARDIAC REHABILITATION> THIS IS SUPER IMPORTANT FOR GETTING YOUR LIFE BACK     Please stop smoking.    --Try to cut back on the number of cigarettes that you smoke.    -- Try to increase the interval between cigarettes.   -- Try to use substitutes such as sugar-free candy carrots,celery sticks as in between.  --  Once you are down to less than half a pack a day try to go cold turkey.   -- Try to designate areas in the your home as smoke-free areas.   -- Try to reduce the number of ashtrays and other reminders of smoking.   -- Try to keep any major something that you dislike next to your cigarette pack to try to develop a mental aversion to smoking      Below are some Heart Health Tips that we provide to all of our patients. I hope you find them useful.     -  We recommend you follow a heart  healthy diet. Watch food labels and try not to eat more than 2,500 mg of sodium per day. Avoid foods high in salt like processed meats (lunch meats, mathews, and sausage), processed foods (boxed dinners, canned soups), fried and fast foods. Monitor serving sizes and if the sodium per serving size is more than 200 mg, avoid those foods. If the sodium per serving size is between 100-200 mg, you can use those in limited quantities. Try to choose foods where the amount of sodium per serving size is less than 100 mg. Try to eat a diet rich in fruits and vegetables, whole grains, low fat dairy products, skinless poultry and fish, nuts, beans, non-tropical vegetable oils. Limit saturated fat, trans fat, sodium, red meats, and sugar-sweetened beverages.   Limit alcohol     -The combination of a reduced-calorie diet and increased physical activity is recommended. Adults should aim to get at least 150 minutes of moderate physical activity per week (30 minutes of moderate physical activities at least 5 days per week). Examples of moderate physical activities include brisk walking, swimming, aerobic dancing, heavy gardening, jumping rope, bicycling 10 MPH or faster, tennis, hiking uphill or with a heavy backpack. Please let us know if you would like to learn more about your nutrition and calories and additional options including weight loss programs to help you reach your goal.     -If you smoke, stop smoking. If you stop smoking you can help get rid of a major source of stress to your heart. Smoking makes your heart rate and blood pressure go up and increases your risk or developing cardiovascular diseases and worsen symptoms associated with heart failure.     -Obtain a BP monitor and monitor your BP daily. Check it around the same time each day; at least 1 hour after taking your medications. Record your BP in a log and bring your log with you to your doctors appointment.     -F/u with your PCP as recommended.     - If you are  "having problems with medications, consider looking at the following websites.   --  \"GoodRx\"   --  \"[facility]\"    - We are happy to supply written prescriptions if needed to allow you to obtain your medications from different pharmacies. Additionally, if you are having issues with mail order delivery, please let us know. We can send a limited supply of your medications to your local pharmacy.   "

## 2024-05-23 ENCOUNTER — OFFICE VISIT (OUTPATIENT)
Dept: OTOLARYNGOLOGY | Facility: CLINIC | Age: 64
End: 2024-05-23
Payer: MEDICAID

## 2024-05-23 VITALS — HEIGHT: 60 IN | WEIGHT: 257.1 LBS | TEMPERATURE: 97.2 F | BODY MASS INDEX: 50.48 KG/M2

## 2024-05-23 DIAGNOSIS — T16.1XXA FOREIGN BODY OF RIGHT EAR, INITIAL ENCOUNTER: ICD-10-CM

## 2024-05-23 DIAGNOSIS — H61.22 IMPACTED CERUMEN OF LEFT EAR: Primary | ICD-10-CM

## 2024-05-23 DIAGNOSIS — H90.3 SENSORINEURAL HEARING LOSS (SNHL) OF BOTH EARS: ICD-10-CM

## 2024-05-23 PROCEDURE — 69210 REMOVE IMPACTED EAR WAX UNI: CPT | Performed by: NURSE PRACTITIONER

## 2024-05-23 PROCEDURE — 3008F BODY MASS INDEX DOCD: CPT | Performed by: NURSE PRACTITIONER

## 2024-05-23 PROCEDURE — 99203 OFFICE O/P NEW LOW 30 MIN: CPT | Performed by: NURSE PRACTITIONER

## 2024-05-23 ASSESSMENT — PATIENT HEALTH QUESTIONNAIRE - PHQ9
SUM OF ALL RESPONSES TO PHQ9 QUESTIONS 1 AND 2: 0
2. FEELING DOWN, DEPRESSED OR HOPELESS: NOT AT ALL
1. LITTLE INTEREST OR PLEASURE IN DOING THINGS: NOT AT ALL

## 2024-05-23 NOTE — PROGRESS NOTES
Subjective   Patient ID: Catarina Finn is a 63 y.o. female who presents for New Patient Visit (Ear Cleaning).  HPI  This patient is referred for evaluation of cerumen impactions.  The patient is not accompanied by anyone.   When asked about ear pain, hearing loss, itching, discharge from ear, tinnitus, aural fullness or autophony, the patient admits to worsening of bilateral hearing loss.  Patient states that she normally wears bilateral hearing aids with good benefit.  Recently, her right 1 stopped working and she notices decreased benefit from the left as well.     When asked about a significant past otological history including history of prior ear surgery, noise exposure, exposure to ototoxic drugs or agents, and/or family history of hearing loss, the patient admits to none.    Review of Systems  A comprehensive or 10 points review of the patient's constitutional, neurological, HEENT, pulmonary, cardiovascular and genito-urinary systems showed only those mentioned in history of present illness.    Objective   Physical Exam  Constitutional: no fever, chills, weight loss or weight gain   General appearance: Appears well, well-nourished, well groomed. No acute distress.   Communication: Normal communication   Psychiatric: Oriented to person, place and time. Normal mood and affect.   Neurologic: Cranial nerves II-XII grossly intact and symmetric bilaterally.   Head and Face:   Head: Atraumatic with no masses, lesions or scarring.   Face: Normal symmetry, no paralysis, synkinesis or facial tic. No scars or deformities.     Eyes: Conjunctiva not edematous or erythematous   Ears: External inspection of ears with no deformity, scars or masses.  Right canal occluded with foreign body and left canal with cerumen impaction.     Neck: Normal appearing, symmetric, trachea midline.   Cardiovascular: Examination of peripheral vascular system shows no clubbing or cyanosis.   Respiratory: No respiratory distress increased work  of breathing. Inspection of the chest with symmetric chest expansion and normal respiratory effort.   Skin: No rashes in the head or neck    Assessment/Plan        This patient presents for initial evaluation of acute acquired left-sided cerumen impaction, right foreign body removal as well as chronic bilateral sensorineural hearing loss.    Reassurance given that otologic exam is normal after cleaning.  She may follow-up as needed.  All questions were answered to patient's satisfaction.    This note was created using speech recognition transcription software. Despite proofreading, several typographical errors might be present that might affect the meaning of the content. Please call with any questions.  Patient ID: Catarina Finn is a 63 y.o. female.    Ear cerumen removal    Date/Time: 5/23/2024 1:56 PM    Performed by: FRED Perez  Authorized by: FRED Perez    Consent:     Consent obtained:  Verbal    Consent given by:  Patient    Risks discussed:  Pain    Alternatives discussed:  No treatment  Procedure details:     Location:  L ear    Procedure type: curette      Procedure outcomes: cerumen removed    Post-procedure details:     Inspection:  No bleeding, ear canal clear and TM intact    Hearing quality:  Improved    Procedure completion:  Tolerated well, no immediate complications    Foreign body removal:  On the right, using the microscope and alligator, foreign body consistent with hearing aid dome removed.  After removal, no bleeding.  Canal clear.  TM intact.  No effusion or retraction noted.  FRED Perez 05/23/24 1:54 PM

## 2024-05-29 ENCOUNTER — APPOINTMENT (OUTPATIENT)
Dept: PRIMARY CARE | Facility: CLINIC | Age: 64
End: 2024-05-29
Payer: MEDICAID

## 2024-06-19 ENCOUNTER — APPOINTMENT (OUTPATIENT)
Dept: ORTHOPEDIC SURGERY | Facility: CLINIC | Age: 64
End: 2024-06-19
Payer: MEDICAID

## 2024-06-19 DIAGNOSIS — M17.12 PRIMARY OSTEOARTHRITIS OF LEFT KNEE: Primary | ICD-10-CM

## 2024-06-19 DIAGNOSIS — E66.1 CLASS 3 DRUG-INDUCED OBESITY WITH BODY MASS INDEX (BMI) OF 50.0 TO 59.9 IN ADULT, UNSPECIFIED WHETHER SERIOUS COMORBIDITY PRESENT (MULTI): ICD-10-CM

## 2024-06-19 PROCEDURE — 3008F BODY MASS INDEX DOCD: CPT | Performed by: FAMILY MEDICINE

## 2024-06-19 PROCEDURE — 20610 DRAIN/INJ JOINT/BURSA W/O US: CPT | Performed by: FAMILY MEDICINE

## 2024-06-19 PROCEDURE — 99214 OFFICE O/P EST MOD 30 MIN: CPT | Performed by: FAMILY MEDICINE

## 2024-06-19 RX ORDER — TRIAMCINOLONE ACETONIDE 40 MG/ML
40 INJECTION, SUSPENSION INTRA-ARTICULAR; INTRAMUSCULAR
Status: COMPLETED | OUTPATIENT
Start: 2024-06-19 | End: 2024-06-19

## 2024-06-19 RX ORDER — LIDOCAINE HYDROCHLORIDE 10 MG/ML
4 INJECTION INFILTRATION; PERINEURAL
Status: COMPLETED | OUTPATIENT
Start: 2024-06-19 | End: 2024-06-19

## 2024-06-19 NOTE — PROGRESS NOTES
** Please excuse any errors in grammar or translation related to this dictation. Voice recognition software was utilized to prepare this document. **    Assessment & Plan:  Patient here for ongoing management of left knee arthritis with exacerbation of symptoms. Hyaluronic injection (Euflexxa) in December did provide some temporary relief, unfortunately no longer covered by insurance.   We again reviewed treatment options to include:  - Maintaining a healthy weight: Every pound of bodyweight is about 4-5 pounds through the lower extremity. Additionally to be candidate for joint replacement surgery in the future, BMI needs to be <40%. Offered nutrition consult which was declined.  Advised to speak with her PCP about weight loss medication options.   - Activity modifications as needed to include use of cane/walker.  - Prescription and otc analgesics to include but not limited to APAP, ibuprofen, naproxen, diclofenac gel.  - Steroid injection. Elected to repeat today.  - Hyaluronic acid injection. New insurance does not cover; can consider self-pay Trivisc at later date.   - Referral to pain management for potential nerve ablation.  Informed patient that steroid injection can be repeated every 3 or more months as symptoms dictate.  All questions answered and patient is agreeable to this plan.       Chief complaint:  Left knee pain    HPI:  6/19/24: Patient following up for left knee pain secondary to arthritis.  Patient reports the Euflexxa series from 12/2023 provided some relief.  Her insurance is no longer covering hyaluronic acid injection therapies.  Pain has been progressively increasing over the last few weeks.  Denies any new injuries.  States she does not use her cane she feels unstable when walking.    12/13/23:  Patient presents for left knee Euflexxa #3 injection. Reporting that overall, feels knee pain has been increasing for several weeks. Particularly during working hours when on feet for several hours  at a time.      12/6/23:  Patient presents for left knee Euflexxa #2 injection.      11/29/23: Patient presents today for ongoing nonoperative management of left knee arthritis.  Had steroid injection completed on October 6th which provided pain relief until the last few days.  She was approved to have viscosupplement injection and is here today to have this completed.  No interval history changes reported.    10/6/23: 62-year-old female, history of a recent MI, presents for chronic left knee pain.  Pain in left knee has been ongoing for several years.  She was previously managed by Dr. Bejarano having a corticosteroid injection completed followed by a Euflexxa series injection completed.  She reports the Euflexxa injections gave her 5 to 6 months of pain relief.  These were completed in 2021.  Last summer she was evaluated Dr. Velázquez and start the authorization process for repeat Euflexxa injections.  However there was a delay in having these obtained and has not yet to have them included.  Presents today to see what can be done to address her pain.  She is unable to use NSAIDs due to her recent MI.  Tylenol does not help with her pain.  She does have a letter with her that states gel injections were approved as of June 16 however these have not been obtained through pharmacy yet.    Exam:  Left knee examined. No effusion, ecchymosis, or erythema.  AROM from 5 to 115 deg with 5/5 strength. SILT overlying knee. Motion crepitus present. Tenderness along medial >lateral joint lines.  No popliteal mass palpated. Negative anterior and posterior drawer.  No laxity to varus or valgus stress at 0 or 30 deg.  No patellar apprehension.      General Exam:  Constitutional - NAD, AAO x 3, conversing appropriately.  HEENT- Normocephalic and atraumatic. EOMI, PERRLA, No scleral icterus. No facial deformities. Hearing grossly normal.  Lungs - Breathing non-labored with normal rate. No accessory muscle use.  CV - Extremities warm  and well-perfused, brisk capillary refill present.   Neuro - CN II-XII grossly intact.    Results:  X-rays of left knee obtained 10/6/2023 demonstrate moderate joint space loss of the medial compartment.    Procedure:  L Inj/Asp: L knee on 6/19/2024 11:21 AM  Indications: pain  Details: 25 G needle, anteromedial approach  Medications: 40 mg triamcinolone acetonide 40 mg/mL; 4 mL lidocaine 10 mg/mL (1 %)  Outcome: tolerated well, no immediate complications    Procedure risk factors to include increased pain, bleeding, infection, neurovascular injury, soft tissue injury, transient elevation of blood glucose and blood pressure, and adverse reaction to medication were discussed with the patient. Patient understands there is a moderate risk of morbidity from undergoing the procedure.    Procedure, treatment alternatives, risks and benefits explained, specific risks discussed. Consent was given by the patient. Immediately prior to procedure a time out was called to verify the correct patient, procedure, equipment, support staff and site/side marked as required. Patient was prepped and draped in the usual sterile fashion.

## 2024-07-03 ENCOUNTER — APPOINTMENT (OUTPATIENT)
Dept: PRIMARY CARE | Facility: CLINIC | Age: 64
End: 2024-07-03
Payer: MEDICAID

## 2024-07-18 ENCOUNTER — LAB (OUTPATIENT)
Dept: LAB | Facility: LAB | Age: 64
End: 2024-07-18
Payer: MEDICAID

## 2024-07-18 ENCOUNTER — APPOINTMENT (OUTPATIENT)
Dept: PRIMARY CARE | Facility: CLINIC | Age: 64
End: 2024-07-18
Payer: MEDICAID

## 2024-07-18 VITALS
RESPIRATION RATE: 15 BRPM | OXYGEN SATURATION: 98 % | HEART RATE: 77 BPM | WEIGHT: 257 LBS | SYSTOLIC BLOOD PRESSURE: 120 MMHG | DIASTOLIC BLOOD PRESSURE: 62 MMHG | HEIGHT: 60 IN | BODY MASS INDEX: 50.45 KG/M2 | TEMPERATURE: 97 F

## 2024-07-18 DIAGNOSIS — M79.671 PAIN OF BOTH HEELS: ICD-10-CM

## 2024-07-18 DIAGNOSIS — F17.210 CIGARETTE NICOTINE DEPENDENCE WITHOUT COMPLICATION: ICD-10-CM

## 2024-07-18 DIAGNOSIS — E66.01 CLASS 3 SEVERE OBESITY DUE TO EXCESS CALORIES WITH SERIOUS COMORBIDITY AND BODY MASS INDEX (BMI) OF 50.0 TO 59.9 IN ADULT (MULTI): ICD-10-CM

## 2024-07-18 DIAGNOSIS — M79.672 PAIN OF BOTH HEELS: ICD-10-CM

## 2024-07-18 DIAGNOSIS — Z71.6 ENCOUNTER FOR SMOKING CESSATION COUNSELING: ICD-10-CM

## 2024-07-18 DIAGNOSIS — E27.8 ADRENAL MASS 1 CM TO 4 CM IN DIAMETER (MULTI): ICD-10-CM

## 2024-07-18 DIAGNOSIS — M54.50 CHRONIC BILATERAL LOW BACK PAIN, UNSPECIFIED WHETHER SCIATICA PRESENT: ICD-10-CM

## 2024-07-18 DIAGNOSIS — Z00.00 WELLNESS EXAMINATION: ICD-10-CM

## 2024-07-18 DIAGNOSIS — G89.29 CHRONIC BILATERAL LOW BACK PAIN, UNSPECIFIED WHETHER SCIATICA PRESENT: ICD-10-CM

## 2024-07-18 DIAGNOSIS — N39.46 MIXED INCONTINENCE URGE AND STRESS: ICD-10-CM

## 2024-07-18 DIAGNOSIS — I21.4 NSTEMI (NON-ST ELEVATED MYOCARDIAL INFARCTION) (MULTI): ICD-10-CM

## 2024-07-18 DIAGNOSIS — M72.2 PLANTAR FASCIITIS: ICD-10-CM

## 2024-07-18 DIAGNOSIS — M17.12 PRIMARY OSTEOARTHRITIS OF LEFT KNEE: ICD-10-CM

## 2024-07-18 DIAGNOSIS — Z00.00 WELLNESS EXAMINATION: Primary | ICD-10-CM

## 2024-07-18 PROBLEM — M79.601 CHRONIC PAIN OF BOTH UPPER EXTREMITIES: Status: ACTIVE | Noted: 2024-07-18

## 2024-07-18 PROBLEM — R40.0 DAYTIME SOMNOLENCE: Status: ACTIVE | Noted: 2024-07-18

## 2024-07-18 PROBLEM — M79.602 CHRONIC PAIN OF BOTH UPPER EXTREMITIES: Status: ACTIVE | Noted: 2024-07-18

## 2024-07-18 PROBLEM — R11.0 NAUSEA: Status: ACTIVE | Noted: 2023-08-13

## 2024-07-18 PROBLEM — S46.009A ROTATOR CUFF INJURY: Status: ACTIVE | Noted: 2024-07-18

## 2024-07-18 PROBLEM — M19.90 ARTHRITIS: Status: ACTIVE | Noted: 2023-08-31

## 2024-07-18 PROBLEM — R53.83 FATIGUE: Status: ACTIVE | Noted: 2023-12-13

## 2024-07-18 PROBLEM — R03.0 ELEVATED BLOOD PRESSURE READING WITHOUT DIAGNOSIS OF HYPERTENSION: Status: ACTIVE | Noted: 2024-07-18

## 2024-07-18 PROBLEM — M75.40 SUBACROMIAL IMPINGEMENT: Status: ACTIVE | Noted: 2024-07-18

## 2024-07-18 LAB
ALBUMIN SERPL BCP-MCNC: 4 G/DL (ref 3.4–5)
ALP SERPL-CCNC: 96 U/L (ref 33–136)
ALT SERPL W P-5'-P-CCNC: 17 U/L (ref 7–45)
ANION GAP SERPL CALC-SCNC: 10 MMOL/L (ref 10–20)
AST SERPL W P-5'-P-CCNC: 21 U/L (ref 9–39)
BILIRUB SERPL-MCNC: 0.5 MG/DL (ref 0–1.2)
BUN SERPL-MCNC: 16 MG/DL (ref 6–23)
CALCIUM SERPL-MCNC: 9 MG/DL (ref 8.6–10.6)
CHLORIDE SERPL-SCNC: 106 MMOL/L (ref 98–107)
CO2 SERPL-SCNC: 29 MMOL/L (ref 21–32)
CREAT SERPL-MCNC: 0.94 MG/DL (ref 0.5–1.05)
EGFRCR SERPLBLD CKD-EPI 2021: 68 ML/MIN/1.73M*2
EST. AVERAGE GLUCOSE BLD GHB EST-MCNC: 117 MG/DL
GLUCOSE SERPL-MCNC: 88 MG/DL (ref 74–99)
HBA1C MFR BLD: 5.7 %
POTASSIUM SERPL-SCNC: 4.6 MMOL/L (ref 3.5–5.3)
PROT SERPL-MCNC: 7 G/DL (ref 6.4–8.2)
SODIUM SERPL-SCNC: 140 MMOL/L (ref 136–145)

## 2024-07-18 PROCEDURE — 36415 COLL VENOUS BLD VENIPUNCTURE: CPT

## 2024-07-18 PROCEDURE — 80053 COMPREHEN METABOLIC PANEL: CPT

## 2024-07-18 PROCEDURE — 3008F BODY MASS INDEX DOCD: CPT | Performed by: FAMILY MEDICINE

## 2024-07-18 PROCEDURE — 83036 HEMOGLOBIN GLYCOSYLATED A1C: CPT

## 2024-07-18 PROCEDURE — 99396 PREV VISIT EST AGE 40-64: CPT | Performed by: FAMILY MEDICINE

## 2024-07-18 PROCEDURE — RXMED WILLOW AMBULATORY MEDICATION CHARGE

## 2024-07-18 PROCEDURE — 99215 OFFICE O/P EST HI 40 MIN: CPT | Performed by: FAMILY MEDICINE

## 2024-07-18 PROCEDURE — 4004F PT TOBACCO SCREEN RCVD TLK: CPT | Performed by: FAMILY MEDICINE

## 2024-07-18 RX ORDER — NICOTINE 7MG/24HR
1 PATCH, TRANSDERMAL 24 HOURS TRANSDERMAL EVERY 24 HOURS
Qty: 14 PATCH | Refills: 1 | Status: SHIPPED | OUTPATIENT
Start: 2024-07-18 | End: 2024-08-02

## 2024-07-18 RX ORDER — GABAPENTIN 300 MG/1
300 CAPSULE ORAL NIGHTLY
Qty: 90 CAPSULE | Refills: 0 | Status: SHIPPED | OUTPATIENT
Start: 2024-07-18 | End: 2024-10-16

## 2024-07-18 RX ORDER — SEMAGLUTIDE 0.25 MG/.5ML
0.25 INJECTION, SOLUTION SUBCUTANEOUS
Qty: 2 ML | Refills: 0 | Status: SHIPPED | OUTPATIENT
Start: 2024-07-21 | End: 2024-08-12

## 2024-07-18 RX ORDER — IBUPROFEN 200 MG
1 TABLET ORAL EVERY 24 HOURS
Qty: 28 PATCH | Refills: 0 | Status: SHIPPED | OUTPATIENT
Start: 2024-07-18 | End: 2024-08-17

## 2024-07-18 ASSESSMENT — PATIENT HEALTH QUESTIONNAIRE - PHQ9
1. LITTLE INTEREST OR PLEASURE IN DOING THINGS: NOT AT ALL
SUM OF ALL RESPONSES TO PHQ9 QUESTIONS 1 AND 2: 0
2. FEELING DOWN, DEPRESSED OR HOPELESS: NOT AT ALL

## 2024-07-18 NOTE — PROGRESS NOTES
Subjective   Patient ID: Catarina Finn is a 63 y.o. female who presents for Annual Exam.    HPI       Here for a physical  Does not want a chaperone.     Other concerns/issues/followup:  1 - Has been with severe knee pain  gained wt  Unable to walk due to pain  Was with gel injections that helped with ortho- but insurance will not cover now    2 -   gained wt  would like to try GLP1 for wt losss  - PMH of Pancreatitis: No  - PMH of Medullary Thyroid Cancer with self/ family: No  - PMH of recurrent Urinary Tract Infections; No  - PMH of recurrent yeast infection:  No       3 -  chronic back pain    4- has been with heel pain     5- inc urination   Seen by urology  Completed Pelvic floor PT-    PMSFH was reviewed & updated.     ---Social---  Job: [employer]  : no  Kids: 2  Likes: relaxing      ETOH - none  Drugs - none  Tobacco - still smoking- not ready to quit    Review of Systems  All systems reviewed and neg if not noted in the HPI above   \    Objective   /62 (Patient Position: Sitting)   Pulse 77   Temp 36.1 °C (97 °F)   Resp 15   Ht 1.524 m (5')   Wt 117 kg (257 lb)   SpO2 98%   BMI 50.19 kg/m²     Physical Exam    Pleasant  Eyes: conjunctiva non-icteric and eye lids are without obvious rash or drooping. Pupils are symmetric.   Ears, Nose, Mouth, and Throat: External ears and nose appear to be without deformity or rash. No lesions or masses noted. Hearing is grossly intact.   CV: RRR, no murmur  Carotids: no bruits  Pulm:CTA B/L  Abd: soft, NTTP, + BS  LE: no edema  Psychiatric: Alert, orientation to person, place, and time. Recent/remote memory as evidenced through face-to-face interaction and discussion appear grossly intact. Mood and affect are normal.       Assessment/Plan   Problem List Items Addressed This Visit             ICD-10-CM    Cigarette nicotine dependence without complication F17.210    Relevant Medications    nicotine (Nicoderm CQ) 14 mg/24 hr patch    nicotine (Nicoderm CQ)  7 mg/24 hr patch    Primary osteoarthritis of left knee M17.12    Relevant Orders    Referral to Pain Medicine    Hemoglobin A1C    Low back pain M54.50    Relevant Medications    gabapentin (Neurontin) 300 mg capsule    Other Relevant Orders    Referral to Pain Medicine    Mixed incontinence urge and stress N39.46    Relevant Orders    Referral to Physical Therapy    Adrenal mass 1 cm to 4 cm in diameter (Multi) E27.8     Referral for endo placed            Other Visit Diagnoses         Codes    Wellness examination    -  Primary Z00.00    Relevant Orders    Hemoglobin A1C    Comprehensive Metabolic Panel    Class 3 severe obesity due to excess calories with serious comorbidity and body mass index (BMI) of 50.0 to 59.9 in adult (Multi)     E66.01, Z68.43    Relevant Medications    semaglutide, weight loss, (Wegovy) 0.25 mg/0.5 mL pen injector (Start on 7/21/2024)    Other Relevant Orders    Hemoglobin A1C    Comprehensive Metabolic Panel    NSTEMI (non-ST elevated myocardial infarction) (Multi)     I21.4    Relevant Medications    semaglutide, weight loss, (Wegovy) 0.25 mg/0.5 mL pen injector (Start on 7/21/2024)    Other Relevant Orders    Comprehensive Metabolic Panel    Plantar fasciitis     M72.2    Relevant Orders    Referral to Podiatry    XR foot 3+ views bilateral    Pain of both heels     M79.671, M79.672    Relevant Orders    Referral to Podiatry    XR foot 3+ views bilateral    Encounter for smoking cessation counseling     Z71.6    Relevant Medications    nicotine (Nicoderm CQ) 14 mg/24 hr patch    nicotine (Nicoderm CQ) 7 mg/24 hr patch

## 2024-07-18 NOTE — PATIENT INSTRUCTIONS
Heel pain  - referral for podiatry  - xray ordered    Knee pain  - referral for pain management  - to call ortho for appt    Back pain  - referral for pain management     Smoking  - Ready to quit!  - Recommended to get a quit date  - Added patches  - Declined smoking cessation       Wt loss  Starting on Weygovy  Use 0.25 weekly X 4 wks  Then use 0.50mg weekly X 4wks  Then use 1 mg weekly X 4wks  Then use 1.7mg weekly X 4wks  Then use 2.4mg weekly there after    If the pharm does not have/runs out of the medication:  We can change the dose (decrease the dose) temporary while waiting for it to come in  You can go without your medication for 2 wks, if you go longer you will need to start over with the starting dose  ---- let me know!       Urination  - to call for urology  - referral for pelvic therapy        Please follow up in  6months for HTN/ wt check or as needed.       ** If labs or imaging ordered at today's visit, all the non-urgent results will be discussed at your next visit    If you have been referred for a special test or to a specialist please call  [phone] to schedule an appointment.  If you have any further questions, or if develop new or worsened symptoms, please give our office a call at [phone].

## 2024-07-19 ENCOUNTER — PHARMACY VISIT (OUTPATIENT)
Dept: PHARMACY | Facility: CLINIC | Age: 64
End: 2024-07-19
Payer: MEDICAID

## 2024-07-19 ENCOUNTER — HOSPITAL ENCOUNTER (OUTPATIENT)
Dept: RADIOLOGY | Facility: CLINIC | Age: 64
Discharge: HOME | End: 2024-07-19
Payer: MEDICAID

## 2024-07-19 DIAGNOSIS — M72.2 PLANTAR FASCIITIS: ICD-10-CM

## 2024-07-19 DIAGNOSIS — M79.672 PAIN OF BOTH HEELS: ICD-10-CM

## 2024-07-19 DIAGNOSIS — M79.671 PAIN OF BOTH HEELS: ICD-10-CM

## 2024-07-19 PROCEDURE — 73630 X-RAY EXAM OF FOOT: CPT | Mod: 50

## 2024-07-31 ENCOUNTER — PATIENT MESSAGE (OUTPATIENT)
Dept: PRIMARY CARE | Facility: CLINIC | Age: 64
End: 2024-07-31
Payer: MEDICAID

## 2024-07-31 DIAGNOSIS — E66.01 CLASS 3 SEVERE OBESITY DUE TO EXCESS CALORIES WITH SERIOUS COMORBIDITY AND BODY MASS INDEX (BMI) OF 50.0 TO 59.9 IN ADULT (MULTI): ICD-10-CM

## 2024-07-31 DIAGNOSIS — I21.4 NSTEMI (NON-ST ELEVATED MYOCARDIAL INFARCTION) (MULTI): ICD-10-CM

## 2024-07-31 RX ORDER — SEMAGLUTIDE 0.25 MG/.5ML
0.25 INJECTION, SOLUTION SUBCUTANEOUS
Qty: 2 ML | Refills: 0 | Status: SHIPPED | OUTPATIENT
Start: 2024-08-04 | End: 2024-08-26

## 2024-08-05 DIAGNOSIS — I21.4 NSTEMI (NON-ST ELEVATED MYOCARDIAL INFARCTION) (MULTI): ICD-10-CM

## 2024-08-05 DIAGNOSIS — E66.01 CLASS 3 SEVERE OBESITY DUE TO EXCESS CALORIES WITH SERIOUS COMORBIDITY AND BODY MASS INDEX (BMI) OF 50.0 TO 59.9 IN ADULT (MULTI): ICD-10-CM

## 2024-08-14 ENCOUNTER — OFFICE VISIT (OUTPATIENT)
Dept: PAIN MEDICINE | Facility: HOSPITAL | Age: 64
End: 2024-08-14
Payer: MEDICAID

## 2024-08-14 DIAGNOSIS — G89.29 CHRONIC BILATERAL LOW BACK PAIN, UNSPECIFIED WHETHER SCIATICA PRESENT: ICD-10-CM

## 2024-08-14 DIAGNOSIS — M54.50 CHRONIC BILATERAL LOW BACK PAIN, UNSPECIFIED WHETHER SCIATICA PRESENT: ICD-10-CM

## 2024-08-14 DIAGNOSIS — M17.12 PRIMARY OSTEOARTHRITIS OF LEFT KNEE: ICD-10-CM

## 2024-08-14 PROCEDURE — 99203 OFFICE O/P NEW LOW 30 MIN: CPT | Performed by: PAIN MEDICINE

## 2024-08-14 NOTE — PROGRESS NOTES
Subjective   Patient ID: Catarina Finn is a 63 y.o. female with a past medical history of morbid obesity, arthritis of multiple joints who presents for left knee pain.       HPI:   53-year-old female who initially presented with right shoulder pain.  Describes pain as aching sharp worse with upright activities associated with some numbness and tingling that is localized to the deltoid region.  Denies numbness or tingling sensation.  Patient follows up with orthopedics for management of right shoulder.  Upon further history patient presents with left knee pain and this is the primary complaint for which she was referred to.  Patient describes pain as aching worse with walking or weightbearing.  Patient has trialed gel injections with good relief.  She has most recently tried cortisone intra-articular knee injection without relief.  Patient is still ambulating and independent with activities of daily living but has a hard time walking long distances due to the pain.  Upon review insurance will not cover repeat hyaluronic acid injection.    Other Conservative Measures she has tried: Heating Pad, Ice, and Injections (steroid and hyaluronic acid)  Classes of medications tried in the past: Acetaminophen and NSAIDs      Review of Systems   13-point ROS done and negative except for HPI.     Current Outpatient Medications   Medication Instructions    acetaminophen (Tylenol) 325 mg tablet oral, Every 6 hours    aspirin 81 mg EC tablet TAKE 1 TABLET BY MOUTH ONCE DAILY    atorvastatin (LIPITOR) 80 mg, oral, Nightly    clopidogrel (Plavix) 75 mg tablet TAKE 1 TABLET BY MOUTH ONCE DAILY    diclofenac sodium (Voltaren) 1 % gel gel 0.5 Applications, Topical, 4 times daily PRN    furosemide (LASIX) 40 mg, oral, Daily    gabapentin (NEURONTIN) 300 mg, oral, Nightly    isosorbide mononitrate ER (IMDUR) 30 mg, oral, Daily, Do not crush or chew.    nicotine (Nicoderm CQ) 14 mg/24 hr patch 1 patch, transdermal, Every 24 hours     nicotine (Nicoderm CQ) 7 mg/24 hr patch 1 patch, transdermal, Every 24 hours, Start after finishing 14mg patches    NON FORMULARY 1 year    sodium hyaluronate (Euflexxa) 10 mg/mL(mw 2.4 -3.6 million) injection intra-articular    verapamil SR (CALAN-SR) 180 mg, oral, Nightly, Do not crush or chew.    Wegovy 0.25 mg, subcutaneous, Once Weekly       Past Medical History:   Diagnosis Date    Body mass index (BMI) 45.0-49.9, adult (Multi) 2021    BMI 45.0-49.9, adult    Encounter for screening for malignant neoplasm of colon 2020    Colon cancer screening    Hypertension     NSTEMI (non-ST elevated myocardial infarction) (Multi)     Other specified health status 2017    No pertinent past medical history    Personal history of other diseases of the musculoskeletal system and connective tissue 2017    History of arthritis    Personal history of other specified conditions 2018    History of urinary incontinence        Past Surgical History:   Procedure Laterality Date     SECTION, CLASSIC  2017     Section    HERNIA REPAIR  2017    Inguinal Hernia Repair    OTHER SURGICAL HISTORY  2017    Leg Repair    OTHER SURGICAL HISTORY  2017    Wrist Surgery    TUBAL LIGATION  2017    Tubal Ligation    VARICOSE VEIN SURGERY  2017    Varicose Vein Ligation        Family History   Problem Relation Name Age of Onset    Diabetes Father          No Known Allergies     Objective     There were no vitals filed for this visit.     Physical Exam  General: NAD, well groomed, well nourished  Eyes: Non-icteric sclera, EOMI  Ears, Nose, Mouth, and Throat: External ears and nose appear to be without deformity or rash. No lesions or masses noted. Hearing is grossly intact.   Neck: Trachea midline  Respiratory: Nonlabored breathing   Cardiovascular: +1 peripheral edema   Skin: No rashes or open lesions/ulcers identified on skin.      Neurologic:   Cranial nerves grossly  intact.   Strength 5/5 and symmetric plantar/dorsiflexion   Sensation: Normal to light touch throughout  Tenderness to palpation along the medial joint line.  No laxity appreciated.  Crepitus on range of motion.    Psychiatric: Alert, orientation to person, place, and time. Cooperative.    Imaging personally reviewed and independently interpreted: X-ray of left knee on 10/6/2023 demonstrate moderate joint space loss affecting the medial compartment    Assessment/Plan   59-year-old female who presents with chronic left knee pain.  X-ray from October 2023 demonstrated moderate joint space narrowing affecting mostly the medial compartment.  Pain likely secondary to osteoarthritis of left knee.  Patient has tried hyaluronic acid injections with good relief.  Most recently had a steroid injection without any relief.  Insurance will not cover repeat hyaluronic acid injection.  Patient referred for possible genicular nerve block.    Plan:  -Genicular nerve block of left knee under ultrasound  -Use TENS unit  - Recommend weight loss and home exercise program  - Can use Tylenol and ibuprofen as needed for pain      We discussed  the risks, benefits and alternatives of the procedure including but not limited to: , Lack of efficacy , Transiently worsening pain , Bleeding, Infection , and Nerve Damage    Follow up: After procedure    The patient was invited to contact us back anytime with any questions or concerns and follow-up with us in the office as needed.     Diagnoses and all orders for this visit:  Primary osteoarthritis of left knee  -     Referral to Pain Medicine  Chronic bilateral low back pain, unspecified whether sciatica present  -     Referral to Pain Medicine      This note was generated with the aid of dictation software, there may be typos despite my attempts at proofreading.     Patient seen and plan of care discussed with Dr. Stover.    Liang Miller MD

## 2024-08-29 ENCOUNTER — HOSPITAL ENCOUNTER (OUTPATIENT)
Facility: HOSPITAL | Age: 64
Discharge: HOME | End: 2024-08-29
Payer: MEDICAID

## 2024-08-29 VITALS
WEIGHT: 250 LBS | SYSTOLIC BLOOD PRESSURE: 136 MMHG | HEIGHT: 64 IN | TEMPERATURE: 98.4 F | RESPIRATION RATE: 16 BRPM | OXYGEN SATURATION: 98 % | HEART RATE: 76 BPM | BODY MASS INDEX: 42.68 KG/M2 | DIASTOLIC BLOOD PRESSURE: 77 MMHG

## 2024-08-29 DIAGNOSIS — M17.12 PRIMARY OSTEOARTHRITIS OF LEFT KNEE: ICD-10-CM

## 2024-08-29 PROCEDURE — 2500000004 HC RX 250 GENERAL PHARMACY W/ HCPCS (ALT 636 FOR OP/ED): Performed by: PAIN MEDICINE

## 2024-08-29 PROCEDURE — 76942 ECHO GUIDE FOR BIOPSY: CPT | Mod: RSC | Performed by: PAIN MEDICINE

## 2024-08-29 PROCEDURE — 64454 NJX AA&/STRD GNCLR NRV BRNCH: CPT | Performed by: PAIN MEDICINE

## 2024-08-29 RX ORDER — BUPIVACAINE HYDROCHLORIDE 5 MG/ML
INJECTION, SOLUTION EPIDURAL; INTRACAUDAL
Status: DISPENSED
Start: 2024-08-29 | End: 2024-08-29

## 2024-08-29 RX ORDER — BUPIVACAINE HYDROCHLORIDE 5 MG/ML
INJECTION, SOLUTION EPIDURAL; INTRACAUDAL
Status: COMPLETED | OUTPATIENT
Start: 2024-08-29 | End: 2024-08-29

## 2024-08-29 ASSESSMENT — PAIN - FUNCTIONAL ASSESSMENT
PAIN_FUNCTIONAL_ASSESSMENT: WONG-BAKER FACES
PAIN_FUNCTIONAL_ASSESSMENT: 0-10
PAIN_FUNCTIONAL_ASSESSMENT: 0-10

## 2024-08-29 ASSESSMENT — PAIN DESCRIPTION - DESCRIPTORS: DESCRIPTORS: ACHING;THROBBING

## 2024-08-29 ASSESSMENT — PAIN SCALES - GENERAL
PAINLEVEL_OUTOF10: 4
PAINLEVEL_OUTOF10: 5 - MODERATE PAIN
PAINLEVEL_OUTOF10: 10 - WORST POSSIBLE PAIN

## 2024-08-29 ASSESSMENT — COLUMBIA-SUICIDE SEVERITY RATING SCALE - C-SSRS
6. HAVE YOU EVER DONE ANYTHING, STARTED TO DO ANYTHING, OR PREPARED TO DO ANYTHING TO END YOUR LIFE?: NO
1. IN THE PAST MONTH, HAVE YOU WISHED YOU WERE DEAD OR WISHED YOU COULD GO TO SLEEP AND NOT WAKE UP?: NO

## 2024-08-29 NOTE — H&P
HISTORY AND PHYSICAL    History Of Present Illness  Catarina Finn is a 63 y.o. female presenting with chronic left knee pain here for  left genicular nerve block; she denies any recent antibiotic use or infections and she denies contrast or local anesthetic allergies     Past Medical History  Past Medical History:   Diagnosis Date    Body mass index (BMI) 45.0-49.9, adult (Multi) 2021    BMI 45.0-49.9, adult    Encounter for screening for malignant neoplasm of colon 2020    Colon cancer screening    Hypertension     NSTEMI (non-ST elevated myocardial infarction) (Multi)     Other specified health status 2017    No pertinent past medical history    Personal history of other diseases of the musculoskeletal system and connective tissue 2017    History of arthritis    Personal history of other specified conditions 2018    History of urinary incontinence       Surgical History  Past Surgical History:   Procedure Laterality Date     SECTION, CLASSIC  2017     Section    HERNIA REPAIR  2017    Inguinal Hernia Repair    OTHER SURGICAL HISTORY  2017    Leg Repair    OTHER SURGICAL HISTORY  2017    Wrist Surgery    TUBAL LIGATION  2017    Tubal Ligation    VARICOSE VEIN SURGERY  2017    Varicose Vein Ligation        Social History  She reports that she has been smoking cigarettes. She uses smokeless tobacco. She reports that she does not drink alcohol and does not use drugs.    Family History  Family History   Problem Relation Name Age of Onset    Diabetes Father          Allergies  Patient has no known allergies.    Review of Systems   12 point ROS done and negative except for the above.   Physical Exam     General: NAD, well groomed, well nourished  Eyes: Non-icteric sclera, EOMI  Ears, Nose, Mouth, and Throat: External ears and nose appear to be without deformity or rash. No lesions or masses noted. Hearing is grossly intact.   Neck:  Trachea midline  Respiratory: Nonlabored breathing   Cardiovascular: No peripheral edema   Skin: No rashes or open lesions/ulcers identified on skin.    Last Recorded Vitals  There were no vitals taken for this visit.    Relevant Results           Assessment/Plan       Risks, benefits, alternatives discussed. All questions answered to the best of my ability. Patient agrees to proceed.   -We will proceed with planned procedure    VAS Pre-procedure: 7/10 (10 being the worst)  VAS Post-procedure: See procedure note.      Jaziel Kathleen MD  Chronic Pain Fellow  [facility]

## 2024-08-29 NOTE — DISCHARGE INSTRUCTIONS
DISCHARGE INSTRUCTIONS FOR INJECTIONS     You underwent left genicular nerve block today    After most injections, it is recommended that you relax and limit your activity for the remainder of the day unless you have been told otherwise by your pain physician.  You should not drive a car, operate machinery, or make important legal decisions unless otherwise directed by your pain physician.  You may resume your normal activity, including exercise, tomorrow.      Keep a written pain diary of how much pain relief you experienced following the injection procedure and the length of time of pain relief you experienced pain relief. Following diagnostic injections like medial branch nerve blocks, sacroiliac joint blocks, stellate ganglion injections and other blocks, it is very important you record the specific amount of pain relief you experienced immediately after the injectionand how long it lasted. Your doctor will ask you for this information at your follow up visit.     For all injections, please keep the injection site dry and inspect the site for a couple of days. You may remove the Band-Aid the day of the injection at any time.     Some discomfort, bruising or slight swelling may occur at the injection site. This is not abnormal if it occurs.  If needed you may:    -Take over the counter medication such as Tylenol or Motrin.   -Apply an ice pack for 30 minutes, 2 to 3 times a day for the first 24 hours.     You may shower today; no soaking baths, hot tubs, whirlpools or swimming pools for two days.      If you are given steroids in your injection, it may take 3-5 days for the steroid medication to take effect. You may notice a worsening of your symptoms for 1-2 days after the injection. This is not abnormal.  You may use acetaminophen, ibuprofen, or prescription medication that your doctor may have prescribed for you if you need to do so.     A few common side effects of steroids include facial flushing, sweating,  restlessness, irritability,difficulty sleeping, increase in blood sugar, and increased blood pressure. If you have diabetes, please monitor your blood sugar at least once a day for at least 5 days. If you have poorly controlled high blood pressure, monitoryour blood pressure for at least 2 days and contact your primary care physician if these numbers are unusually high for you.      If you take aspirin or non-steroidal anti-inflammatory drugs (examples are Motrin, Advil, ibuprofen, Naprosyn, Voltaren, Relafen, etc.) you may restart these this evening, but stop taking it 3 days before your next appointment, unless instructed otherwiseby your physician.      You do not need to discontinue non-aspirin-containing pain medications prior to an injection (examples: Celebrex, tramadol, hydrocodone and acetaminophen).      If you take a blood thinning medication (Coumadin, Lovenox, Fragmin,Ticlid, Plavix, Pradaxa, etc.), please discuss this with your primary care physician/cardiologist and your pain physician. These medications MUST be discontinued before you can have an injection safely, without the risk of uncontrolled bleeding. If these medications are not discontinued for an appropriate period of time, you will not be able to receivean injection. Please adhere to instructions given to you about when to restart your blood thinning medication. If you have any questions please reach out to our team.    If you are taking Coumadin, please have your INR checked the morning of your procedure and bring the result to your appointment unless otherwise instructed. If your INR is over 1.2, your injection may need to be rescheduled to avoid uncontrolled bleeding from the needle placement.     Call [facility]  and ask for Pain Management at [phone] between 8am-4pm Monday - Friday if you are experiencing the following:    If you received an epidural or spinal injection:    -Headache that doesnot go away with medicine, is worse  when sitting or standing up, and is greatly relieved upon lying down.   -Severe pain worse than or different than your baseline pain.   -Chills or fever (101º F or greater).   -Drainage or signs of infection at the injection site     Go directly to the Emergency Department if you are experiencing the following and received an epidural or spinal injection:   -Abrupt weakness or progressive weakness in your legs that starts after you leave the clinic.   -Abrupt severe or worsening numbness in your legs.   -Inability to urinate after the injection or loss of bowel or bladder control without the urge to defecate or urinate.     If you have a clinical question that cannot wait until your next appointment, please call [phone] between 8am-4pm Monday - Friday or send a [employer] message. We do our best to return all non-emergency messages within 24 hours, Monday - Friday. A nurse or physician will return your message. You may also try calling Dr. Trey Cruz's/Jolanta's nurse ([phone]) and they will do their best to answer your question(s).    If you need to cancel an appointment, please call the scheduling staff at [phone] during normal business hours or leave a message at least 24 hours in advance.     If you are going to be sedated for your next procedure, you MUST have responsible adult who can legally drive accompany you home. You cannot eat or drink for at least eight hours prior to the planned procedure if you are going to receive sedation. You may take your non-blood thinning medications with a small sip of water.

## 2024-09-19 ENCOUNTER — OFFICE VISIT (OUTPATIENT)
Dept: CARDIOLOGY | Facility: CLINIC | Age: 64
End: 2024-09-19
Payer: MEDICAID

## 2024-09-19 ENCOUNTER — ANCILLARY PROCEDURE (OUTPATIENT)
Dept: CARDIOLOGY | Facility: CLINIC | Age: 64
End: 2024-09-19
Payer: MEDICAID

## 2024-09-19 VITALS
DIASTOLIC BLOOD PRESSURE: 75 MMHG | HEIGHT: 65 IN | HEART RATE: 83 BPM | BODY MASS INDEX: 44.15 KG/M2 | OXYGEN SATURATION: 95 % | WEIGHT: 265 LBS | SYSTOLIC BLOOD PRESSURE: 115 MMHG

## 2024-09-19 DIAGNOSIS — I21.4 NON-ST ELEVATION MYOCARDIAL INFARCTION (NSTEMI), SUBSEQUENT EPISODE OF CARE (MULTI): ICD-10-CM

## 2024-09-19 DIAGNOSIS — R03.0 ELEVATED BLOOD PRESSURE READING WITHOUT DIAGNOSIS OF HYPERTENSION: ICD-10-CM

## 2024-09-19 DIAGNOSIS — F17.210 CIGARETTE NICOTINE DEPENDENCE WITHOUT COMPLICATION: ICD-10-CM

## 2024-09-19 DIAGNOSIS — E66.01 MORBID OBESITY (MULTI): ICD-10-CM

## 2024-09-19 DIAGNOSIS — E78.2 MIXED HYPERLIPIDEMIA: Primary | ICD-10-CM

## 2024-09-19 DIAGNOSIS — R06.02 SHORTNESS OF BREATH ON EXERTION: ICD-10-CM

## 2024-09-19 PROCEDURE — 93005 ELECTROCARDIOGRAM TRACING: CPT

## 2024-09-19 PROCEDURE — 99213 OFFICE O/P EST LOW 20 MIN: CPT | Performed by: INTERNAL MEDICINE

## 2024-09-19 PROCEDURE — 3008F BODY MASS INDEX DOCD: CPT | Performed by: INTERNAL MEDICINE

## 2024-09-19 PROCEDURE — 4004F PT TOBACCO SCREEN RCVD TLK: CPT | Performed by: INTERNAL MEDICINE

## 2024-09-19 RX ORDER — MELOXICAM 15 MG/1
1 TABLET ORAL
COMMUNITY
Start: 2024-09-16

## 2024-09-19 ASSESSMENT — ENCOUNTER SYMPTOMS
DEPRESSION: 0
OCCASIONAL FEELINGS OF UNSTEADINESS: 1
LOSS OF SENSATION IN FEET: 0

## 2024-09-19 ASSESSMENT — PAIN SCALES - GENERAL: PAINLEVEL: 0-NO PAIN

## 2024-09-19 NOTE — PROGRESS NOTES
"Chief Complaint:   Follow-up (4 month)     History Of Present Illness:    Catarina Finn is a 63 y.o. female presenting with  pertinent medical history notable for NSTEMI ( Presumed MINOCA, Peak Troponin 3868 ) Non-obstructive coronary artery disease per cath 8/14/2023 with less than 30% occlusive disease in all coronary distribution, Dyslipidemia, essential hypertension, morbid obesity with BMI greater than 40, cigarette and nicotine dependence disorder, obstructive sleep apnea, urinary frequency with urge incontinence presents to cardiology for posthospitalization follow-up and establishment of care.     Recall, she presented to [facility] 8/14/2023 and subsequently transferred to [facility] [place] after presenting with chest discomfort. Initially high-sensitivity cardiac troponins at 109, subsequently peaked at 3800. She underwent cardiac catheterization via right femoral artery (Right Radial Artery with Radial Bend unable to be navigated ) which showed nonobstructive coronary anatomy. LVEDP was not obtained. An echocardiogram completed 8/14 showed ejection fraction 65 to 70%, no wall motion abnormalities, normal diastolic filling, no significant valvular dysfunction, RVSP 35. She was put on dual antiplatelet therapy for medical management of NSTEMI and subsequently discharged.     Since her discharge, she has continued to do well. She has not had any chest heaviness, pressure, pain. She has been compliant with all medications. She however still struggles with cigarette smoking. She is trying to exercise by walking. She offers no cardiovascular complaints.    11/10/2023 -- She returns for follow up. She reports that she is \"still not 100%\". She is able to do many of her activities, but feels that she is is weak and will sometimes feel \"pain\"  States that her \"Mind is telling her one thing, but my mind is telling me another\"  States that she has pain at night when lying in bed and will need to move about to find " "a different position.     1/15/2024 -- She returns for follow up. She still complains of some shortnbess of breath an states that others note that \"she breathes heavy\"  States that she is still working and walking at the jobn is the summation of her exercise. She notes that when grocery shopping, she will need to cathc her breath as well or slow her pace. She does not improvement in symptoms with the Verapamil     5/17/2024 --she returns for scheduled follow.  Since she was last seen, she had 1 episode of discomfort that brought her to the emergency department.  It sounded like possibly a dysphagia event, pill esophagitis.  It resolved after she belched, drink ginger ale, 8 a [employer] sandwich.  She has not had any recurrent chest discomfort.  She unfortunately continues to smoke.  Since we saw her in January prescribed her Lasix, she has not taken any of it.  Her weight is increased.  She continues to have some shortness of breath as well as extremity swelling and abdominal distention.    9/19/2024 -- She returns for follow up care.  She has not started the lasix to assist with fluid retntion and blood pressure control as previously recommended.  She still continues to have some shortness of breath, lower extremity swelling and abdominal distention.  She continues to struggle with her smoking habit.  She has not increased nor has she successfully decreased.  She is trying however to quit.  She remains symptomatology free from heart palpitations and chest pain however.      Patient reports intermittent fleeting chest pain but no clear-cut angina. Pt denies shortness of breath, dyspnea on exertion, orthopnea, and paroxysmal nocturnal dyspnea. Pt denies worsening lower extremity edema. Pt denies palpitations or syncope. No recent falls. No fever or chills. No cough. No change in bowel or bladder habits. No travel. No sick contacts. No recent travel.     Last Recorded Vitals:  Vitals:    09/19/24 0849   BP: 115/75   BP " "Location: Right arm   Patient Position: Sitting   Pulse: 83   SpO2: 95%   Weight: 120 kg (265 lb)   Height: 1.651 m (5' 5\")       Weight change:       Past Medical History:  She has a past medical history of Body mass index (BMI) 45.0-49.9, adult (Multi) (2021), Encounter for screening for malignant neoplasm of colon (2020), Hypertension, NSTEMI (non-ST elevated myocardial infarction) (Multi), Other specified health status (2017), Personal history of other diseases of the musculoskeletal system and connective tissue (2017), and Personal history of other specified conditions (2018).    Past Surgical History:  She has a past surgical history that includes Other surgical history (2017); Tubal ligation (2017); Other surgical history (2017); Hernia repair (2017);  section, classic (2017); and Varicose vein surgery (2017).      Social History:  She reports that she has been smoking cigarettes. She uses smokeless tobacco. She reports that she does not drink alcohol and does not use drugs.    Family History:  Family History   Problem Relation Name Age of Onset    Diabetes Father          Allergies:  Patient has no known allergies.    Outpatient Medications:  Current Outpatient Medications   Medication Instructions    acetaminophen (Tylenol) 325 mg tablet oral, Every 6 hours    aspirin 81 mg EC tablet TAKE 1 TABLET BY MOUTH ONCE DAILY    atorvastatin (LIPITOR) 80 mg, oral, Nightly    clopidogrel (Plavix) 75 mg tablet TAKE 1 TABLET BY MOUTH ONCE DAILY    diclofenac sodium (Voltaren) 1 % gel gel 0.5 Applications, Topical, 4 times daily PRN    furosemide (LASIX) 40 mg, oral, Daily    gabapentin (NEURONTIN) 300 mg, oral, Nightly    isosorbide mononitrate ER (IMDUR) 30 mg, oral, Daily, Do not crush or chew.    meloxicam (Mobic) 15 mg tablet 1 tablet, oral, Daily (630)    nicotine (Nicoderm CQ) 14 mg/24 hr patch 1 patch, transdermal, Every 24 hours    " "nicotine (Nicoderm CQ) 7 mg/24 hr patch 1 patch, transdermal, Every 24 hours, Start after finishing 14mg patches    verapamil SR (CALAN-SR) 180 mg, oral, Nightly, Do not crush or chew.    Wegovy 0.25 mg, subcutaneous, Once Weekly       Physical Exam:  /75 (BP Location: Right arm, Patient Position: Sitting)   Pulse 83   Ht 1.651 m (5' 5\")   Wt 120 kg (265 lb)   SpO2 95%   BMI 44.10 kg/m²   General:  Patient is awake, alert, and oriented.  Patient is in no acute distress.  HEENT:  Pupils equal and reactive.  Normocephalic.  Moist mucosa.    Neck:  No thyromegaly.  Normal Jugular Venous Pressure.  Cardiovascular:  Regular rate and rhythm.  Normal S1 and S2.  1/6 CAYDEN.  Pulmonary:  Clear to auscultation bilaterally.  Abdomen:  Soft. Non-tender.   Non-distended.  Positive bowel sounds.  Lower Extremities:  2+ pedal pulses. No LE edema.  Neurologic:  Cranial nerves intact.  No focal deficit.   Skin: Skin warm and dry, normal skin turgor.   Psychiatric: Normal affect.               Last Labs:  CBC -  Lab Results   Component Value Date    WBC 5.5 03/02/2024    HGB 12.9 03/02/2024    HCT 40.5 03/02/2024    MCV 94 03/02/2024     03/02/2024       CMP -  Lab Results   Component Value Date    CALCIUM 9.0 07/18/2024    PHOS 3.5 01/10/2024    PROT 7.0 07/18/2024    ALBUMIN 4.0 07/18/2024    AST 21 07/18/2024    ALT 17 07/18/2024    ALKPHOS 96 07/18/2024    BILITOT 0.5 07/18/2024       LIPID PANEL -   Lab Results   Component Value Date    CHOL 160 01/10/2024    TRIG 32 01/10/2024    HDL 77.3 01/10/2024    CHHDL 2.1 01/10/2024    LDLF 120 (H) 08/14/2023    VLDL 6 01/10/2024    NHDL 83 01/10/2024       RENAL FUNCTION PANEL -   Lab Results   Component Value Date    GLUCOSE 88 07/18/2024     07/18/2024    K 4.6 07/18/2024     07/18/2024    CO2 29 07/18/2024    ANIONGAP 10 07/18/2024    BUN 16 07/18/2024    CREATININE 0.94 07/18/2024    CALCIUM 9.0 07/18/2024    PHOS 3.5 01/10/2024    ALBUMIN 4.0 07/18/2024 "        Lab Results   Component Value Date    BNP 79 08/14/2023    BNP CANCELED 08/14/2023    HGBA1C 5.7 (H) 07/18/2024       Last Cardiology Tests:  ECG:      In Office EKG 9/129/2024      In office EKG 5/17/2024 sinus rhythm at 98 bpm        In Office EKG 11/10/2023 -- Sinus Rhythm at 68 BPM   In Office EKG 8/21/2023 -- Sinus Rhythm @ 76 BPM    Echo:  Echocardiogram 08/2023  CONCLUSIONS:  1. Left ventricular systolic function is normal with a 65-70% estimated ejection fraction.  2. Mildly elevated RVSP.     Echocarriogram Stress Test 05/2022  Summary:  1. No wall motion abnormality on resting or stress echo images, however they are off axis limiting sensitivity of the test.  2. Below average functional capacity 4.6 METs.  3. No clinical or electrocardiographic evidence for ischemia at a maximal workload.  4. The adequate level of stress was achieved.    Cath:  Cardiac Catheterization 08/14/2023  Coronary Angiography:  The coronary circulation is right dominant.     Left Main Coronary Artery:  The left main coronary artery is a normal caliber vessel. The left main arises normally from the left coronary sinus of Valsalva and bifurcates into the LAD and circumflex coronary arteries. The left main coronary artery showed no significant disease or stenosis greater than 30%.     Left Anterior Descending Coronary Artery Distribution:  The left anterior descending coronary artery is a normal caliber vessel. The LAD arises normally from the left main coronary artery. The LAD demonstrated no significant disease or stenosis greater than 30%.     Circumflex Coronary Artery Distribution:  The circumflex coronary artery is a normal caliber vessel. The circumflex arises normally from the left main coronary artery and terminates in the AV groove. The circumflex revealed no significant disease or stenosis greater than 30%.     Right Coronary Artery Distribution:     The right coronary artery is a normal caliber vessel. The RCA  "arises normally from the right sinus of Valsalva. The RCA showed no significant disease or stenosis greater than 30%.         Lab review: I have personally reviewed the laboratory result(s)   Diagnostic review: I have personally reviewed the result(s) of the EKG and Echocardiogram .     Assessment/Plan   Problem List Items Addressed This Visit       Cigarette nicotine dependence without complication    Overview     1/2024 -- Still Smoking at least 1/4 PPD   5/2024 -- Still smoking 1/4 PPD  9/2024 -- Still smoking 1/4 PPD          Current Assessment & Plan     Smoking cessation again provided to encourage abstinence  \" I have the patches, But I haven't started yet\"          Elevated blood pressure reading without diagnosis of hypertension    Hyperlipidemia - Primary    Overview     NSTEMI due to MINOCA 08/2023  -- Medical Therapy   Lab Results   Component Value Date    CHOL 160 01/10/2024    CHOL 192 08/14/2023    CHOL 180 03/17/2022     Lab Results   Component Value Date    HDL 77.3 01/10/2024    HDL 63.2 08/14/2023    HDL 76.5 03/17/2022     Lab Results   Component Value Date    LDLCALC 76 01/10/2024     Lab Results   Component Value Date    TRIG 32 01/10/2024    TRIG 44 08/14/2023    TRIG 38 03/17/2022     No components found for: \"CHOLHDL\"           Current Assessment & Plan     Currently maintained on Atorvastatin 80 mg PO Daily   -- Reassess lipid profile           Morbid obesity (Multi)    Overview     Body mass index is 44.11 kg/m².  -- She has put 15# on from 8/2024 --> 9/2024  -- Recommendations to use Furosemide, avoid the lifestyle decisions that she knows are detrimental to her long term health            Non-ST elevation myocardial infarction (NSTEMI), subsequent episode of care (Multi)    Overview     NSTEMI ( Presumed MINOCA, Peak Troponin 3868 ) Non-obstructive coronary artery disease per cath 8/14/2023 with less than 30% occlusive disease in all coronary distribution; An echocardiogram completed " 8/14 showed ejection fraction 65 to 70%, no wall motion abnormalities, normal diastolic filling, no significant valvular dysfunction, RVSP 35.          Shortness of breath on exertion    Overview     Reports shortness of breath with exertion in the setting o Non-Obstructive Coronary Disease   -- Added NTG / Verapamil for Coronary Vasospasm   -- Add Furosemie to further address BP / Volukme  --> Not using as of 9/2024  -- Stop Smoking --> Unfortunately continues ( 9/2024)                    Dusty Sharpe, DO

## 2024-09-19 NOTE — ASSESSMENT & PLAN NOTE
"Smoking cessation again provided to encourage abstinence  \" I have the patches, But I haven't started yet\"   "

## 2024-09-20 LAB
ATRIAL RATE: 83 BPM
P AXIS: 52 DEGREES
P OFFSET: 186 MS
P ONSET: 130 MS
PR INTERVAL: 188 MS
Q ONSET: 224 MS
QRS COUNT: 14 BEATS
QRS DURATION: 64 MS
QT INTERVAL: 380 MS
QTC CALCULATION(BAZETT): 446 MS
QTC FREDERICIA: 423 MS
R AXIS: 57 DEGREES
T AXIS: 57 DEGREES
T OFFSET: 414 MS
VENTRICULAR RATE: 83 BPM

## 2024-10-02 ENCOUNTER — PATIENT MESSAGE (OUTPATIENT)
Dept: PRIMARY CARE | Facility: CLINIC | Age: 64
End: 2024-10-02
Payer: MEDICAID

## 2024-10-23 ENCOUNTER — TELEPHONE (OUTPATIENT)
Dept: OPERATING ROOM | Facility: HOSPITAL | Age: 64
End: 2024-10-23
Payer: MEDICAID

## 2024-11-22 DIAGNOSIS — E78.2 MIXED HYPERLIPIDEMIA: ICD-10-CM

## 2024-11-22 DIAGNOSIS — R03.0 ELEVATED BLOOD PRESSURE READING WITHOUT DIAGNOSIS OF HYPERTENSION: ICD-10-CM

## 2024-11-22 DIAGNOSIS — I21.4 NON-ST ELEVATION MYOCARDIAL INFARCTION (NSTEMI), SUBSEQUENT EPISODE OF CARE (MULTI): ICD-10-CM

## 2024-11-22 NOTE — TELEPHONE ENCOUNTER
Requested Prescriptions     Pending Prescriptions Disp Refills    clopidogrel (Plavix) 75 mg tablet 90 tablet 3     Sig: TAKE 1 TABLET BY MOUTH ONCE DAILY

## 2024-11-23 RX ORDER — CLOPIDOGREL BISULFATE 75 MG/1
75 TABLET ORAL DAILY
Qty: 90 TABLET | Refills: 3 | Status: SHIPPED | OUTPATIENT
Start: 2024-11-23 | End: 2025-11-23

## 2024-11-23 RX ORDER — ATORVASTATIN CALCIUM 80 MG/1
80 TABLET, FILM COATED ORAL NIGHTLY
Qty: 90 TABLET | Refills: 3 | Status: SHIPPED | OUTPATIENT
Start: 2024-11-23 | End: 2025-11-23

## 2024-11-23 RX ORDER — ISOSORBIDE MONONITRATE 30 MG/1
30 TABLET, EXTENDED RELEASE ORAL DAILY
Qty: 90 TABLET | Refills: 3 | Status: SHIPPED | OUTPATIENT
Start: 2024-11-23 | End: 2025-11-23

## 2024-11-23 RX ORDER — VERAPAMIL HYDROCHLORIDE 180 MG/1
180 TABLET, EXTENDED RELEASE ORAL NIGHTLY
Qty: 90 TABLET | Refills: 3 | Status: SHIPPED | OUTPATIENT
Start: 2024-11-23 | End: 2025-11-23

## 2024-11-25 PROCEDURE — RXMED WILLOW AMBULATORY MEDICATION CHARGE

## 2024-11-26 ENCOUNTER — PHARMACY VISIT (OUTPATIENT)
Dept: PHARMACY | Facility: CLINIC | Age: 64
End: 2024-11-26
Payer: MEDICAID

## 2025-01-30 ENCOUNTER — APPOINTMENT (OUTPATIENT)
Dept: PRIMARY CARE | Facility: CLINIC | Age: 65
End: 2025-01-30
Payer: MEDICAID

## 2025-04-17 ENCOUNTER — OFFICE VISIT (OUTPATIENT)
Dept: CARDIOLOGY | Facility: CLINIC | Age: 65
End: 2025-04-17
Payer: MEDICARE

## 2025-04-17 VITALS
HEART RATE: 90 BPM | OXYGEN SATURATION: 95 % | BODY MASS INDEX: 41.78 KG/M2 | WEIGHT: 260 LBS | SYSTOLIC BLOOD PRESSURE: 118 MMHG | DIASTOLIC BLOOD PRESSURE: 73 MMHG | HEIGHT: 66 IN

## 2025-04-17 DIAGNOSIS — G47.33 OBSTRUCTIVE SLEEP APNEA, ADULT: ICD-10-CM

## 2025-04-17 DIAGNOSIS — R03.0 ELEVATED BLOOD PRESSURE READING WITHOUT DIAGNOSIS OF HYPERTENSION: ICD-10-CM

## 2025-04-17 DIAGNOSIS — F17.210 CIGARETTE NICOTINE DEPENDENCE WITHOUT COMPLICATION: ICD-10-CM

## 2025-04-17 DIAGNOSIS — R06.02 SHORTNESS OF BREATH ON EXERTION: ICD-10-CM

## 2025-04-17 DIAGNOSIS — I21.4 NON-ST ELEVATION MYOCARDIAL INFARCTION (NSTEMI), SUBSEQUENT EPISODE OF CARE (MULTI): ICD-10-CM

## 2025-04-17 DIAGNOSIS — E78.2 MIXED HYPERLIPIDEMIA: Primary | ICD-10-CM

## 2025-04-17 DIAGNOSIS — E66.01 MORBID OBESITY (MULTI): ICD-10-CM

## 2025-04-17 LAB
ATRIAL RATE: 90 BPM
P AXIS: 61 DEGREES
P OFFSET: 194 MS
P ONSET: 140 MS
PR INTERVAL: 164 MS
Q ONSET: 222 MS
QRS COUNT: 15 BEATS
QRS DURATION: 62 MS
QT INTERVAL: 358 MS
QTC CALCULATION(BAZETT): 437 MS
QTC FREDERICIA: 409 MS
R AXIS: 66 DEGREES
T AXIS: 65 DEGREES
T OFFSET: 401 MS
VENTRICULAR RATE: 90 BPM

## 2025-04-17 PROCEDURE — 3008F BODY MASS INDEX DOCD: CPT | Performed by: INTERNAL MEDICINE

## 2025-04-17 PROCEDURE — 93005 ELECTROCARDIOGRAM TRACING: CPT | Performed by: INTERNAL MEDICINE

## 2025-04-17 PROCEDURE — 99215 OFFICE O/P EST HI 40 MIN: CPT | Performed by: INTERNAL MEDICINE

## 2025-04-17 PROCEDURE — 99406 BEHAV CHNG SMOKING 3-10 MIN: CPT | Performed by: INTERNAL MEDICINE

## 2025-04-17 PROCEDURE — G2211 COMPLEX E/M VISIT ADD ON: HCPCS | Performed by: INTERNAL MEDICINE

## 2025-04-17 PROCEDURE — 1160F RVW MEDS BY RX/DR IN RCRD: CPT | Performed by: INTERNAL MEDICINE

## 2025-04-17 PROCEDURE — 1159F MED LIST DOCD IN RCRD: CPT | Performed by: INTERNAL MEDICINE

## 2025-04-17 PROCEDURE — 99215 OFFICE O/P EST HI 40 MIN: CPT | Mod: 25 | Performed by: INTERNAL MEDICINE

## 2025-04-17 RX ORDER — FUROSEMIDE 40 MG/1
40 TABLET ORAL DAILY
Qty: 90 TABLET | Refills: 3 | Status: SHIPPED | OUTPATIENT
Start: 2025-04-17 | End: 2026-04-17

## 2025-04-17 RX ORDER — VERAPAMIL HYDROCHLORIDE 180 MG/1
180 TABLET, EXTENDED RELEASE ORAL NIGHTLY
Qty: 90 TABLET | Refills: 3 | Status: SHIPPED | OUTPATIENT
Start: 2025-04-17 | End: 2026-04-17

## 2025-04-17 RX ORDER — CLOPIDOGREL BISULFATE 75 MG/1
75 TABLET ORAL DAILY
Qty: 90 TABLET | Refills: 3 | Status: SHIPPED | OUTPATIENT
Start: 2025-04-17 | End: 2026-04-17

## 2025-04-17 RX ORDER — ISOSORBIDE MONONITRATE 30 MG/1
30 TABLET, EXTENDED RELEASE ORAL DAILY
Qty: 90 TABLET | Refills: 3 | Status: SHIPPED | OUTPATIENT
Start: 2025-04-17 | End: 2026-04-17

## 2025-04-17 RX ORDER — ATORVASTATIN CALCIUM 80 MG/1
80 TABLET, FILM COATED ORAL NIGHTLY
Qty: 90 TABLET | Refills: 3 | Status: SHIPPED | OUTPATIENT
Start: 2025-04-17 | End: 2026-04-17

## 2025-04-17 RX ORDER — ACETAMINOPHEN 500 MG/1
500 CAPSULE, LIQUID FILLED ORAL EVERY 6 HOURS PRN
COMMUNITY

## 2025-04-17 ASSESSMENT — ENCOUNTER SYMPTOMS: DEPRESSION: 0

## 2025-04-17 NOTE — PROGRESS NOTES
"Chief Complaint:   No chief complaint on file.     History Of Present Illness:    Catarina Finn is a 64 y.o. female presenting with  pertinent medical history notable for NSTEMI ( Presumed MINOCA, Peak Troponin 3868 ) Non-obstructive coronary artery disease per cath 8/14/2023 with less than 30% occlusive disease in all coronary distribution, Dyslipidemia, essential hypertension, morbid obesity with BMI greater than 40, cigarette and nicotine dependence disorder, obstructive sleep apnea, urinary frequency with urge incontinence presents to cardiology for posthospitalization follow-up and establishment of care.     Recall, she presented to [facility] 8/14/2023 and subsequently transferred to [facility] after presenting with chest discomfort. Initially high-sensitivity cardiac troponins at 109, subsequently peaked at 3800. She underwent cardiac catheterization via right femoral artery (Right Radial Artery with Radial Bend unable to be navigated ) which showed nonobstructive coronary anatomy. LVEDP was not obtained. An echocardiogram completed 8/14 showed ejection fraction 65 to 70%, no wall motion abnormalities, normal diastolic filling, no significant valvular dysfunction, RVSP 35. She was put on dual antiplatelet therapy for medical management of NSTEMI and subsequently discharged.     Since her discharge, she has continued to do well. She has not had any chest heaviness, pressure, pain. She has been compliant with all medications. She however still struggles with cigarette smoking. She is trying to exercise by walking. She offers no cardiovascular complaints.    11/10/2023 -- She returns for follow up. She reports that she is \"still not 100%\". She is able to do many of her activities, but feels that she is is weak and will sometimes feel \"pain\"  States that her \"Mind is telling her one thing, but my mind is telling me another\"  States that she has pain at night when lying in bed and will need to move about " "to find a different position.     1/15/2024 -- She returns for follow up. She still complains of some shortnbess of breath an states that others note that \"she breathes heavy\"  States that she is still working and walking at the jobn is the summation of her exercise. She notes that when grocery shopping, she will need to cathc her breath as well or slow her pace. She does not improvement in symptoms with the Verapamil     5/17/2024 --she returns for scheduled follow.  Since she was last seen, she had 1 episode of discomfort that brought her to the emergency department.  It sounded like possibly a dysphagia event, pill esophagitis.  It resolved after she belched, drink ginger ale, 8 a [employer] sandwich.  She has not had any recurrent chest discomfort.  She unfortunately continues to smoke.  Since we saw her in January prescribed her Lasix, she has not taken any of it.  Her weight is increased.  She continues to have some shortness of breath as well as extremity swelling and abdominal distention.    9/19/2024 -- She returns for follow up care.  She has not started the lasix to assist with fluid retntion and blood pressure control as previously recommended.  She still continues to have some shortness of breath, lower extremity swelling and abdominal distention.  She continues to struggle with her smoking habit.  She has not increased nor has she successfully decreased.  She is trying however to quit.  She remains symptomatology free from heart palpitations and chest pain however.    4/17/2025 --> She returns for follow up. She notes that she has not be using her lasix as prescribed. She has not been active since September. She     Patient reports intermittent fleeting chest pain but no clear-cut angina. Pt denies shortness of breath, dyspnea on exertion, orthopnea, and paroxysmal nocturnal dyspnea. Pt denies worsening lower extremity edema. Pt denies palpitations or syncope. No recent falls. No fever or chills. No cough. " "No change in bowel or bladder habits. No travel. No sick contacts. No recent travel.     Last Recorded Vitals:  Vitals:    25 0859   BP: 118/73   BP Location: Right arm   Patient Position: Sitting   BP Cuff Size: Adult   Pulse: 90   SpO2: 95%   Weight: 118 kg (260 lb)   Height: 1.676 m (5' 6\")         Weight change:       Past Medical History:  She has a past medical history of Body mass index (BMI) 45.0-49.9, adult (Multi) (2021), Encounter for screening for malignant neoplasm of colon (2020), Hypertension, NSTEMI (non-ST elevated myocardial infarction) (Multi), Other specified health status (2017), Personal history of other diseases of the musculoskeletal system and connective tissue (2017), and Personal history of other specified conditions (2018).    Past Surgical History:  She has a past surgical history that includes Other surgical history (2017); Tubal ligation (2017); Other surgical history (2017); Hernia repair (2017);  section, classic (2017); and Varicose vein surgery (2017).      Social History:  She reports that she has been smoking cigarettes. She uses smokeless tobacco. She reports that she does not drink alcohol and does not use drugs.    Family History:  Family History   Problem Relation Name Age of Onset    Diabetes Father          Allergies:  Patient has no known allergies.    Outpatient Medications:  Current Outpatient Medications   Medication Instructions    acetaminophen (TYLENOL) 500 mg, Every 6 hours PRN    atorvastatin (LIPITOR) 80 mg, oral, Nightly    clopidogrel (Plavix) 75 mg tablet TAKE 1 TABLET BY MOUTH ONCE DAILY    diclofenac sodium (Voltaren) 1 % gel gel 0.5 Applications, Topical, 4 times daily PRN    furosemide (LASIX) 40 mg, oral, Daily    isosorbide mononitrate ER (IMDUR) 30 mg, oral, Daily, Do not crush or chew.    meloxicam (Mobic) 15 mg tablet 1 tablet, Daily (30)    verapamil SR (CALAN-SR) 180 " "mg, oral, Nightly, Do not crush or chew.       Physical Exam:  /73 (BP Location: Right arm, Patient Position: Sitting, BP Cuff Size: Adult)   Pulse 90   Ht 1.676 m (5' 6\")   Wt 118 kg (260 lb)   SpO2 95%   BMI 41.97 kg/m²   General:  Patient is awake, alert, and oriented.  Patient is in no acute distress. She is Hard of Hearing   HEENT:  Pupils equal and reactive.  Normocephalic.  Wearing mask     Neck:  No thyromegaly.  Normal Jugular Venous Pressure.  Cardiovascular:  Regular rate and rhythm.  Normal S1 and S2.  1/6 CAYDEN.  Pulmonary:  Clear to auscultation bilaterally.  Abdomen:  Soft. Non-tender.   Non-distended.  Positive bowel sounds.  Lower Extremities:  2+ pedal pulses. No LE edema.  Neurologic:  Cranial nerves intact.  No focal deficit.   Skin: Skin warm and dry, normal skin turgor.   Psychiatric: Normal affect.    Last Labs:  CBC -  Lab Results   Component Value Date    WBC 5.5 03/02/2024    HGB 12.9 03/02/2024    HCT 40.5 03/02/2024    MCV 94 03/02/2024     03/02/2024       CMP -  Lab Results   Component Value Date    CALCIUM 9.0 07/18/2024    PHOS 3.5 01/10/2024    PROT 7.0 07/18/2024    ALBUMIN 4.0 07/18/2024    AST 21 07/18/2024    ALT 17 07/18/2024    ALKPHOS 96 07/18/2024    BILITOT 0.5 07/18/2024       LIPID PANEL -   Lab Results   Component Value Date    CHOL 160 01/10/2024    TRIG 32 01/10/2024    HDL 77.3 01/10/2024    CHHDL 2.1 01/10/2024    LDLF 120 (H) 08/14/2023    VLDL 6 01/10/2024    NHDL 83 01/10/2024       RENAL FUNCTION PANEL -   Lab Results   Component Value Date    GLUCOSE 88 07/18/2024     07/18/2024    K 4.6 07/18/2024     07/18/2024    CO2 29 07/18/2024    ANIONGAP 10 07/18/2024    BUN 16 07/18/2024    CREATININE 0.94 07/18/2024    CALCIUM 9.0 07/18/2024    PHOS 3.5 01/10/2024    ALBUMIN 4.0 07/18/2024        Lab Results   Component Value Date    BNP 79 08/14/2023    BNP CANCELED 08/14/2023    HGBA1C 5.7 (H) 07/18/2024       Last Cardiology Tests:  ECG:   "          In Office EKG 9/129/2024      In office EKG 5/17/2024 sinus rhythm at 98 bpm        In Office EKG 11/10/2023 -- Sinus Rhythm at 68 BPM   In Office EKG 8/21/2023 -- Sinus Rhythm @ 76 BPM    Echo:  Echocardiogram 08/2023  CONCLUSIONS:  1. Left ventricular systolic function is normal with a 65-70% estimated ejection fraction.  2. Mildly elevated RVSP.     Echocarriogram Stress Test 05/2022  Summary:  1. No wall motion abnormality on resting or stress echo images, however they are off axis limiting sensitivity of the test.  2. Below average functional capacity 4.6 METs.  3. No clinical or electrocardiographic evidence for ischemia at a maximal workload.  4. The adequate level of stress was achieved.    Cath:  Cardiac Catheterization 08/14/2023  Coronary Angiography:  The coronary circulation is right dominant.     Left Main Coronary Artery:  The left main coronary artery is a normal caliber vessel. The left main arises normally from the left coronary sinus of Valsalva and bifurcates into the LAD and circumflex coronary arteries. The left main coronary artery showed no significant disease or stenosis greater than 30%.     Left Anterior Descending Coronary Artery Distribution:  The left anterior descending coronary artery is a normal caliber vessel. The LAD arises normally from the left main coronary artery. The LAD demonstrated no significant disease or stenosis greater than 30%.     Circumflex Coronary Artery Distribution:  The circumflex coronary artery is a normal caliber vessel. The circumflex arises normally from the left main coronary artery and terminates in the AV groove. The circumflex revealed no significant disease or stenosis greater than 30%.     Right Coronary Artery Distribution:     The right coronary artery is a normal caliber vessel. The RCA arises normally from the right sinus of Valsalva. The RCA showed no significant disease or stenosis greater than 30%.         Lab review: I have personally  "reviewed the laboratory result(s)   Diagnostic review: I have personally reviewed the result(s) of the EKG and Echocardiogram .     Assessment/Plan   Problem List Items Addressed This Visit       Cigarette nicotine dependence without complication    Overview   1/2024 -- Still Smoking at least 1/4 PPD   5/2024 -- Still smoking 1/4 PPD  9/2024 -- Still smoking 1/4 PPD   4/2025 -- Still smoking 1/4 PPD   -- Again, greater than 3 minutes but less than 10 minutes spent on nicotine cessation strategies including use of patch, nicotine replacement other medications.  She continues to remain precontemplative.  She does have access to nicotine replacement patches however, does not use them and does not want more at this time.  --Other strategies were also discussed          Current Assessment & Plan   Smoking cessation again provided to encourage abstinence  \" I have the patches, But I haven't started yet\"  remains her          Relevant Orders    CBC and Auto Differential    Comprehensive metabolic panel    Elevated blood pressure reading without diagnosis of hypertension    Relevant Medications    furosemide (Lasix) 40 mg tablet    verapamil SR (Calan-SR) 180 mg ER tablet    Other Relevant Orders    B-Type Natriuretic Peptide    CBC and Auto Differential    Comprehensive metabolic panel    Hyperlipidemia - Primary    Overview   NSTEMI due to MINOCA 08/2023  -- Medical Therapy   Lab Results   Component Value Date    CHOL 160 01/10/2024    CHOL 192 08/14/2023    CHOL 180 03/17/2022     Lab Results   Component Value Date    HDL 77.3 01/10/2024    HDL 63.2 08/14/2023    HDL 76.5 03/17/2022     Lab Results   Component Value Date    LDLCALC 76 01/10/2024     Lab Results   Component Value Date    TRIG 32 01/10/2024    TRIG 44 08/14/2023    TRIG 38 03/17/2022     No components found for: \"CHOLHDL\"           Relevant Medications    atorvastatin (Lipitor) 80 mg tablet    Other Relevant Orders    ECG 12 lead (Clinic Performed)    CBC " and Auto Differential    Comprehensive metabolic panel    Morbid obesity (Multi)    Overview   Body mass index is 41.97 kg/m².  -- Recommendations to use Furosemide, avoid the lifestyle decisions that she knows are detrimental to her long term health   -- Continues to remain Hesitant            Current Assessment & Plan   [facility] referral had been placed and not followed up   --   Diet / Exercise Life style          Relevant Orders    CBC and Auto Differential    Comprehensive metabolic panel    Referral to [facility]    Non-ST elevation myocardial infarction (NSTEMI), subsequent episode of care (Multi)    Overview   NSTEMI ( Presumed MINOCA, Peak Troponin 3868 ) Non-obstructive coronary artery disease per cath 8/14/2023 with less than 30% occlusive disease in all coronary distribution; An echocardiogram completed 8/14 showed ejection fraction 65 to 70%, no wall motion abnormalities, normal diastolic filling, no significant valvular dysfunction, RVSP 35.          Relevant Medications    clopidogrel (Plavix) 75 mg tablet    furosemide (Lasix) 40 mg tablet    isosorbide mononitrate ER (Imdur) 30 mg 24 hr tablet    verapamil SR (Calan-SR) 180 mg ER tablet    Other Relevant Orders    Troponin I, High Sensitivity    CBC and Auto Differential    Comprehensive metabolic panel    Lipid Panel    Obstructive sleep apnea, adult    Overview   Non- Compliant with Sleep Apnea Therapy which is likely contributing to her somnolence,          Current Assessment & Plan   Referral Placed to re-establish care.   Improve compliance   Needs equipment and reminded to call and order replacement          Relevant Orders    Referral to Adult Sleep Medicine    Shortness of breath on exertion    Overview   Reports shortness of breath with exertion in the setting o Non-Obstructive Coronary Disease   -- Added NTG / Verapamil for Coronary Vasospasm   -- Add Furosemie to further address BP / Volukme  --> Not using as of 9/2024 / Continues to not  use   -- Stop Smoking --> Unfortunately continues (         Current Assessment & Plan   4/17/2025  She has continued to smoke   She has not been using her lasix  Her symptoms are unchanged.    -- Stressed the importance of exercise, appropriate diet,          Relevant Medications    furosemide (Lasix) 40 mg tablet    Other Relevant Orders    B-Type Natriuretic Peptide    CBC and Auto Differential    Comprehensive metabolic panel              Dusty Sharpe, DO

## 2025-04-17 NOTE — ASSESSMENT & PLAN NOTE
"Smoking cessation again provided to encourage abstinence  \" I have the patches, But I haven't started yet\"  remains her   "

## 2025-04-17 NOTE — PATIENT INSTRUCTIONS
It was a pleasure seeing you today. I would like to see you back in clinic in 4 months. You can call my office if questions arise between now and our next visit.     Today we talked about the following:   You need to make sure that you are taking the medications to help you     Please look at Mario Vega on Youtube to help start exercising ( [email or web address])  All of his exercise programs are on YOUTUBE.. Try 10 minutes in the Morning and 10 minutes in the evening.     Please continue  using Verapamil 180 mg Daily AT NIGHT.  This can cause a lower blood pressure ( Which we want for you ) as well as help with smooth muscle spasms    Please continue Isosorbide Mononitrate ( Imdur) 30 mg in the Morning.  It is a long acting nitrate that helps to open blood vessels.  This may cause a headache initially. This gets better.  Please give thi at least a week,     Please begin using Furosemide 40 mg Daily to help with blood pressure and some fluid retention. Take this first thing in the morning to get the best results.     Please have blood work done. We may need to increase your cholesterol lower regimen.       Please call and enroll in [facility]    Please stop smoking.    --Try to cut back on the number of cigarettes that you smoke.    -- Try to increase the interval between cigarettes.   -- Try to use substitutes such as sugar-free candy carrots,celery sticks as in between.  --  Once you are down to less than half a pack a day try to go cold turkey.   -- Try to designate areas in the your home as smoke-free areas.   -- Try to reduce the number of ashtrays and other reminders of smoking.   -- Try to keep any major something that you dislike next to your cigarette pack to try to develop a mental aversion to smoking      Below are some Heart Health Tips that we provide to all of our patients. I hope you find them useful.     -  We recommend you follow a heart healthy diet. Watch food labels and try not to eat more than 2,500  mg of sodium per day. Avoid foods high in salt like processed meats (lunch meats, mathews, and sausage), processed foods (boxed dinners, canned soups), fried and fast foods. Monitor serving sizes and if the sodium per serving size is more than 200 mg, avoid those foods. If the sodium per serving size is between 100-200 mg, you can use those in limited quantities. Try to choose foods where the amount of sodium per serving size is less than 100 mg. Try to eat a diet rich in fruits and vegetables, whole grains, low fat dairy products, skinless poultry and fish, nuts, beans, non-tropical vegetable oils. Limit saturated fat, trans fat, sodium, red meats, and sugar-sweetened beverages.   Limit alcohol     -The combination of a reduced-calorie diet and increased physical activity is recommended. Adults should aim to get at least 150 minutes of moderate physical activity per week (30 minutes of moderate physical activities at least 5 days per week). Examples of moderate physical activities include brisk walking, swimming, aerobic dancing, heavy gardening, jumping rope, bicycling 10 MPH or faster, tennis, hiking uphill or with a heavy backpack. Please let us know if you would like to learn more about your nutrition and calories and additional options including weight loss programs to help you reach your goal.     -If you smoke, stop smoking. If you stop smoking you can help get rid of a major source of stress to your heart. Smoking makes your heart rate and blood pressure go up and increases your risk or developing cardiovascular diseases and worsen symptoms associated with heart failure.     -Obtain a BP monitor and monitor your BP daily. Check it around the same time each day; at least 1 hour after taking your medications. Record your BP in a log and bring your log with you to your doctors appointment.     -F/u with your PCP as recommended.     - If you are having problems with medications, consider looking at the following  "websites.   --  \"GoodRx\"   --  \"[facility]\"    - We are happy to supply written prescriptions if needed to allow you to obtain your medications from different pharmacies. Additionally, if you are having issues with mail order delivery, please let us know. We can send a limited supply of your medications to your local pharmacy.   "

## 2025-04-17 NOTE — ASSESSMENT & PLAN NOTE
4/17/2025  She has continued to smoke   She has not been using her lasix  Her symptoms are unchanged.    -- Stressed the importance of exercise, appropriate diet,

## 2025-04-17 NOTE — ASSESSMENT & PLAN NOTE
Referral Placed to re-establish care.   Improve compliance   Needs equipment and reminded to call and order replacement

## 2025-04-18 LAB
ALBUMIN SERPL-MCNC: 3.9 G/DL (ref 3.6–5.1)
ALP SERPL-CCNC: 101 U/L (ref 37–153)
ALT SERPL-CCNC: 14 U/L (ref 6–29)
ANION GAP SERPL CALCULATED.4IONS-SCNC: 7 MMOL/L (CALC) (ref 7–17)
AST SERPL-CCNC: 18 U/L (ref 10–35)
BASOPHILS # BLD AUTO: 50 CELLS/UL (ref 0–200)
BASOPHILS NFR BLD AUTO: 1.1 %
BILIRUB SERPL-MCNC: 0.5 MG/DL (ref 0.2–1.2)
BNP SERPL-MCNC: 52 PG/ML
BUN SERPL-MCNC: 15 MG/DL (ref 7–25)
CALCIUM SERPL-MCNC: 9.2 MG/DL (ref 8.6–10.4)
CHLORIDE SERPL-SCNC: 106 MMOL/L (ref 98–110)
CHOLEST SERPL-MCNC: 155 MG/DL
CHOLEST/HDLC SERPL: 2.2 (CALC)
CO2 SERPL-SCNC: 27 MMOL/L (ref 20–32)
CREAT SERPL-MCNC: 0.82 MG/DL (ref 0.5–1.05)
EGFRCR SERPLBLD CKD-EPI 2021: 79 ML/MIN/1.73M2
EOSINOPHIL # BLD AUTO: 230 CELLS/UL (ref 15–500)
EOSINOPHIL NFR BLD AUTO: 5.1 %
ERYTHROCYTE [DISTWIDTH] IN BLOOD BY AUTOMATED COUNT: 13.3 % (ref 11–15)
GLUCOSE SERPL-MCNC: 87 MG/DL (ref 65–99)
HCT VFR BLD AUTO: 41.2 % (ref 35–45)
HDLC SERPL-MCNC: 70 MG/DL
HGB BLD-MCNC: 13.3 G/DL (ref 11.7–15.5)
LDLC SERPL CALC-MCNC: 72 MG/DL (CALC)
LYMPHOCYTES # BLD AUTO: 1107 CELLS/UL (ref 850–3900)
LYMPHOCYTES NFR BLD AUTO: 24.6 %
MCH RBC QN AUTO: 30.2 PG (ref 27–33)
MCHC RBC AUTO-ENTMCNC: 32.3 G/DL (ref 32–36)
MCV RBC AUTO: 93.6 FL (ref 80–100)
MONOCYTES # BLD AUTO: 351 CELLS/UL (ref 200–950)
MONOCYTES NFR BLD AUTO: 7.8 %
NEUTROPHILS # BLD AUTO: 2763 CELLS/UL (ref 1500–7800)
NEUTROPHILS NFR BLD AUTO: 61.4 %
NONHDLC SERPL-MCNC: 85 MG/DL (CALC)
PLATELET # BLD AUTO: 181 THOUSAND/UL (ref 140–400)
PMV BLD REES-ECKER: 11.3 FL (ref 7.5–12.5)
POTASSIUM SERPL-SCNC: 4.9 MMOL/L (ref 3.5–5.3)
PROT SERPL-MCNC: 6.9 G/DL (ref 6.1–8.1)
RBC # BLD AUTO: 4.4 MILLION/UL (ref 3.8–5.1)
SODIUM SERPL-SCNC: 140 MMOL/L (ref 135–146)
TRIGL SERPL-MCNC: 46 MG/DL
TROPONIN I SERPL-MCNC: <3 NG/L
WBC # BLD AUTO: 4.5 THOUSAND/UL (ref 3.8–10.8)

## 2025-05-15 ENCOUNTER — TELEPHONE (OUTPATIENT)
Dept: CARDIOLOGY | Facility: CLINIC | Age: 65
End: 2025-05-15
Payer: MEDICARE

## 2025-05-15 NOTE — TELEPHONE ENCOUNTER
Patient called the office, reiterated Dr. Sharpe's recommendations. Patient verbalized understanding and agreeable.

## 2025-05-15 NOTE — TELEPHONE ENCOUNTER
Patient called office and left this message with . patient wants to know if she can can eat a honey stick that has shilajit and ashwagandha in it, pt wants to make sure its okay to eat and wont mess with her medications like her blood thinner. let me know something and ill call her back.     Per Dr. Sharpe there is not enough research to support taking supplements such as these safely with blood thinners and other cardiac medications. He would advise against it.    Called patient twice on her cell phone. No answer and unable to LM.

## 2025-10-17 ENCOUNTER — APPOINTMENT (OUTPATIENT)
Dept: CARDIOLOGY | Facility: CLINIC | Age: 65
End: 2025-10-17
Payer: MEDICARE